# Patient Record
Sex: MALE | Race: WHITE | NOT HISPANIC OR LATINO | URBAN - METROPOLITAN AREA
[De-identification: names, ages, dates, MRNs, and addresses within clinical notes are randomized per-mention and may not be internally consistent; named-entity substitution may affect disease eponyms.]

---

## 2017-11-11 ENCOUNTER — OUTPATIENT (OUTPATIENT)
Dept: OUTPATIENT SERVICES | Facility: HOSPITAL | Age: 42
LOS: 1 days | Discharge: HOME | End: 2017-11-11

## 2017-11-11 DIAGNOSIS — M54.5 LOW BACK PAIN: ICD-10-CM

## 2017-11-18 ENCOUNTER — OUTPATIENT (OUTPATIENT)
Dept: OUTPATIENT SERVICES | Facility: HOSPITAL | Age: 42
LOS: 1 days | Discharge: HOME | End: 2017-11-18

## 2017-11-18 DIAGNOSIS — M54.5 LOW BACK PAIN: ICD-10-CM

## 2018-01-07 ENCOUNTER — TRANSCRIPTION ENCOUNTER (OUTPATIENT)
Age: 43
End: 2018-01-07

## 2018-01-19 ENCOUNTER — TRANSCRIPTION ENCOUNTER (OUTPATIENT)
Age: 43
End: 2018-01-19

## 2018-12-13 ENCOUNTER — OUTPATIENT (OUTPATIENT)
Dept: OUTPATIENT SERVICES | Facility: HOSPITAL | Age: 43
LOS: 1 days | Discharge: HOME | End: 2018-12-13

## 2018-12-13 DIAGNOSIS — F32.3 MAJOR DEPRESSIVE DISORDER, SINGLE EPISODE, SEVERE WITH PSYCHOTIC FEATURES: ICD-10-CM

## 2019-01-09 PROBLEM — Z00.00 ENCOUNTER FOR PREVENTIVE HEALTH EXAMINATION: Status: ACTIVE | Noted: 2019-01-09

## 2019-01-31 ENCOUNTER — APPOINTMENT (OUTPATIENT)
Dept: SURGERY | Facility: CLINIC | Age: 44
End: 2019-01-31
Payer: MEDICAID

## 2019-01-31 VITALS — WEIGHT: 315 LBS | BODY MASS INDEX: 46.65 KG/M2 | HEIGHT: 69 IN

## 2019-01-31 DIAGNOSIS — K42.9 UMBILICAL HERNIA W/OUT OBSTRUCTION OR GANGRENE: ICD-10-CM

## 2019-01-31 DIAGNOSIS — M62.08 SEPARATION OF MUSCLE (NONTRAUMATIC), OTHER SITE: ICD-10-CM

## 2019-01-31 PROCEDURE — 99203 OFFICE O/P NEW LOW 30 MIN: CPT

## 2019-01-31 NOTE — PHYSICAL EXAM
[Normal Breath Sounds] : Normal breath sounds [No Rash or Lesion] : No rash or lesion [Alert] : alert [Calm] : calm [JVD] : no jugular venous distention  [de-identified] : morbidly obese [de-identified] : normal [de-identified] : protuberant abdomen, diastasis recti\par  [de-identified] : reducible umbilical hernia

## 2019-01-31 NOTE — ASSESSMENT
[FreeTextEntry1] : Javan is a pleasant 43-year-old gentleman with a past medical history significant for anxiety and depression along with morbid obesity who presents with concerns about some mild asymmetry in the umbilical region suspicious for hernia. His extended family member recently had surgery with me and recommended he seek consultation with me in this regard.\par \par Physical examination demonstrates a small strawberry size bulge which is nontender and easily reducible consistent with an asymptomatic umbilical hernia. There is no evidence of incarceration or strangulation, and the patient denies any symptoms of obstruction.  He does have a large diastasis recti which is most likely related to his excess abdominal weight. Despite having lost nearly 80 pounds with a new FDA approved weight loss drug, his current BMI is still 61.\par \par Javan was counseled and reassured. He generally avoids any heavy lifting or strenuous activity and has no symptoms in the periumbilical region. We also discussed the importance of calorie restriction and healthy eating with regard to weight loss, hernia recurrence and one's overall health. I recommended he continue losing weight and once he reaches his goal weight, or sooner if he develops any symptoms in the area, we will repair his hernia.  He'll return to me in the future once he is ready for repair.

## 2019-01-31 NOTE — CONSULT LETTER
[Dear  ___] : Dear  [unfilled], [Courtesy Letter:] : I had the pleasure of seeing your patient, [unfilled], in my office today. [Please see my note below.] : Please see my note below. [Consult Closing:] : Thank you very much for allowing me to participate in the care of this patient.  If you have any questions, please do not hesitate to contact me. [FreeTextEntry3] : Respectfully,\par \par Madan Mccarthy M.D., FACS\par

## 2019-09-13 ENCOUNTER — RESULT REVIEW (OUTPATIENT)
Age: 44
End: 2019-09-13

## 2019-09-13 ENCOUNTER — OUTPATIENT (OUTPATIENT)
Dept: OUTPATIENT SERVICES | Facility: HOSPITAL | Age: 44
LOS: 1 days | Discharge: HOME | End: 2019-09-13
Payer: MEDICAID

## 2019-09-13 PROCEDURE — 88172 CYTP DX EVAL FNA 1ST EA SITE: CPT | Mod: 26

## 2019-09-13 PROCEDURE — 88173 CYTOPATH EVAL FNA REPORT: CPT | Mod: 26

## 2019-09-13 PROCEDURE — 88305 TISSUE EXAM BY PATHOLOGIST: CPT | Mod: 26

## 2019-09-13 PROCEDURE — 88177 CYTP FNA EVAL EA ADDL: CPT | Mod: 26

## 2019-09-13 PROCEDURE — 10005 FNA BX W/US GDN 1ST LES: CPT

## 2019-09-13 NOTE — PROGRESS NOTE ADULT - SUBJECTIVE AND OBJECTIVE BOX
INTERVENTIONAL RADIOLOGY BRIEF-OPERATIVE NOTE    Procedure: Left thyroid FNA    Pre-Op Diagnosis:  NUO    Post-Op Diagnosis:  NUO    Attending: Lourdes  Resident:  SADIQ    Anesthesia (type):  [ ] General Anesthesia  [ ] Sedation  [ ] Spinal Anesthesia  [x ] Local/Regional    Contrast: 0cc    Estimated Blood Loss: 0cc    Condition:   [ ] Critical  [ ] Serious  [ ] Fair   [x ] Good    Findings/Follow up Plan of Care:  3.6 cm left thyroid nodule    Specimens Removed:  FNA x 3    Implants:  none    Complications:  none immediate    Disposition:  DC home      Please call Interventional Radiology [phone] with any questions, concerns, or issues.

## 2019-09-20 DIAGNOSIS — C73 MALIGNANT NEOPLASM OF THYROID GLAND: ICD-10-CM

## 2019-09-20 DIAGNOSIS — E04.1 NONTOXIC SINGLE THYROID NODULE: ICD-10-CM

## 2019-10-02 ENCOUNTER — APPOINTMENT (OUTPATIENT)
Dept: OTOLARYNGOLOGY | Facility: CLINIC | Age: 44
End: 2019-10-02
Payer: MEDICAID

## 2019-10-02 DIAGNOSIS — Z78.9 OTHER SPECIFIED HEALTH STATUS: ICD-10-CM

## 2019-10-02 PROCEDURE — 99204 OFFICE O/P NEW MOD 45 MIN: CPT | Mod: 25

## 2019-10-02 PROCEDURE — 31575 DIAGNOSTIC LARYNGOSCOPY: CPT

## 2019-10-02 RX ORDER — SERTRALINE HYDROCHLORIDE 25 MG/1
TABLET, FILM COATED ORAL
Refills: 0 | Status: DISCONTINUED | COMMUNITY
End: 2019-10-02

## 2019-10-02 NOTE — HISTORY OF PRESENT ILLNESS
[de-identified] : 44 year old patient is present today for a thyroid nodule. FNA shows malignant cells [place] category VI . He has been aware that he has this thyroid nodule since august. No compressive symptoms. He denies dysphagia, hoarse voice or neck tightness.  Patient f/u with endocrinologist Dr. Garcia. No recent weight loss. Nodule was found to have nodule on exam by endo.

## 2019-10-02 NOTE — REVIEW OF SYSTEMS
[Patient Intake Form Reviewed] : Patient intake form was reviewed [As Noted in HPI] : as noted in HPI [Negative] : Endocrine [FreeTextEntry1] : all other ros negative

## 2019-10-02 NOTE — PROCEDURE
[Topical Lidocaine] : topical lidocaine [Oxymetazoline HCl] : oxymetazoline HCl [Flexible Endoscope] : examined with the flexible endoscope [Lesion] : lesion identified by mirror examination needing further evaluation [Normal] : posterior cricoid area had healthy pink mucosa in the interarytenoid area and the esophageal inlet

## 2019-11-17 ENCOUNTER — FORM ENCOUNTER (OUTPATIENT)
Age: 44
End: 2019-11-17

## 2019-11-18 ENCOUNTER — OUTPATIENT (OUTPATIENT)
Dept: OUTPATIENT SERVICES | Facility: HOSPITAL | Age: 44
LOS: 1 days | Discharge: HOME | End: 2019-11-18
Payer: COMMERCIAL

## 2019-11-18 VITALS
WEIGHT: 315 LBS | HEIGHT: 69 IN | RESPIRATION RATE: 20 BRPM | HEART RATE: 120 BPM | TEMPERATURE: 97 F | SYSTOLIC BLOOD PRESSURE: 140 MMHG | OXYGEN SATURATION: 97 % | DIASTOLIC BLOOD PRESSURE: 87 MMHG

## 2019-11-18 DIAGNOSIS — C73 MALIGNANT NEOPLASM OF THYROID GLAND: ICD-10-CM

## 2019-11-18 DIAGNOSIS — Z01.818 ENCOUNTER FOR OTHER PREPROCEDURAL EXAMINATION: ICD-10-CM

## 2019-11-18 LAB
ALBUMIN SERPL ELPH-MCNC: 5.1 G/DL — SIGNIFICANT CHANGE UP (ref 3.5–5.2)
ALP SERPL-CCNC: 71 U/L — SIGNIFICANT CHANGE UP (ref 30–115)
ALT FLD-CCNC: 41 U/L — SIGNIFICANT CHANGE UP (ref 0–41)
ANION GAP SERPL CALC-SCNC: 18 MMOL/L — HIGH (ref 7–14)
APTT BLD: 42.6 SEC — HIGH (ref 27–39.2)
AST SERPL-CCNC: 30 U/L — SIGNIFICANT CHANGE UP (ref 0–41)
BASOPHILS # BLD AUTO: 0.09 K/UL — SIGNIFICANT CHANGE UP (ref 0–0.2)
BASOPHILS NFR BLD AUTO: 0.9 % — SIGNIFICANT CHANGE UP (ref 0–1)
BILIRUB SERPL-MCNC: 0.4 MG/DL — SIGNIFICANT CHANGE UP (ref 0.2–1.2)
BLD GP AB SCN SERPL QL: SIGNIFICANT CHANGE UP
BUN SERPL-MCNC: 17 MG/DL — SIGNIFICANT CHANGE UP (ref 10–20)
CALCIUM SERPL-MCNC: 10.3 MG/DL — HIGH (ref 8.5–10.1)
CHLORIDE SERPL-SCNC: 99 MMOL/L — SIGNIFICANT CHANGE UP (ref 98–110)
CO2 SERPL-SCNC: 23 MMOL/L — SIGNIFICANT CHANGE UP (ref 17–32)
CREAT SERPL-MCNC: 0.9 MG/DL — SIGNIFICANT CHANGE UP (ref 0.7–1.5)
EOSINOPHIL # BLD AUTO: 0.11 K/UL — SIGNIFICANT CHANGE UP (ref 0–0.7)
EOSINOPHIL NFR BLD AUTO: 1.1 % — SIGNIFICANT CHANGE UP (ref 0–8)
GLUCOSE SERPL-MCNC: 108 MG/DL — HIGH (ref 70–99)
HCT VFR BLD CALC: 43.4 % — SIGNIFICANT CHANGE UP (ref 42–52)
HGB BLD-MCNC: 14.4 G/DL — SIGNIFICANT CHANGE UP (ref 14–18)
IMM GRANULOCYTES NFR BLD AUTO: 3.6 % — HIGH (ref 0.1–0.3)
INR BLD: 1.05 RATIO — SIGNIFICANT CHANGE UP (ref 0.65–1.3)
LYMPHOCYTES # BLD AUTO: 1.96 K/UL — SIGNIFICANT CHANGE UP (ref 1.2–3.4)
LYMPHOCYTES # BLD AUTO: 19.6 % — LOW (ref 20.5–51.1)
MCHC RBC-ENTMCNC: 29.8 PG — SIGNIFICANT CHANGE UP (ref 27–31)
MCHC RBC-ENTMCNC: 33.2 G/DL — SIGNIFICANT CHANGE UP (ref 32–37)
MCV RBC AUTO: 89.9 FL — SIGNIFICANT CHANGE UP (ref 80–94)
MONOCYTES # BLD AUTO: 0.57 K/UL — SIGNIFICANT CHANGE UP (ref 0.1–0.6)
MONOCYTES NFR BLD AUTO: 5.7 % — SIGNIFICANT CHANGE UP (ref 1.7–9.3)
NEUTROPHILS # BLD AUTO: 6.89 K/UL — HIGH (ref 1.4–6.5)
NEUTROPHILS NFR BLD AUTO: 69.1 % — SIGNIFICANT CHANGE UP (ref 42.2–75.2)
NRBC # BLD: 0 /100 WBCS — SIGNIFICANT CHANGE UP (ref 0–0)
PLATELET # BLD AUTO: 219 K/UL — SIGNIFICANT CHANGE UP (ref 130–400)
POTASSIUM SERPL-MCNC: 4.7 MMOL/L — SIGNIFICANT CHANGE UP (ref 3.5–5)
POTASSIUM SERPL-SCNC: 4.7 MMOL/L — SIGNIFICANT CHANGE UP (ref 3.5–5)
PROT SERPL-MCNC: 8.5 G/DL — HIGH (ref 6–8)
PROTHROM AB SERPL-ACNC: 12.1 SEC — SIGNIFICANT CHANGE UP (ref 9.95–12.87)
RBC # BLD: 4.83 M/UL — SIGNIFICANT CHANGE UP (ref 4.7–6.1)
RBC # FLD: 14.6 % — HIGH (ref 11.5–14.5)
SODIUM SERPL-SCNC: 140 MMOL/L — SIGNIFICANT CHANGE UP (ref 135–146)
WBC # BLD: 9.98 K/UL — SIGNIFICANT CHANGE UP (ref 4.8–10.8)
WBC # FLD AUTO: 9.98 K/UL — SIGNIFICANT CHANGE UP (ref 4.8–10.8)

## 2019-11-18 PROCEDURE — 93010 ELECTROCARDIOGRAM REPORT: CPT

## 2019-11-18 PROCEDURE — 71046 X-RAY EXAM CHEST 2 VIEWS: CPT | Mod: 26

## 2019-11-18 RX ORDER — DULAGLUTIDE 4.5 MG/.5ML
0.75 INJECTION, SOLUTION SUBCUTANEOUS
Qty: 0 | Refills: 0 | DISCHARGE

## 2019-11-18 NOTE — H&P PST ADULT - REASON FOR ADMISSION
45 yo morbidly obese patient present to PAST for Total thyroidectomy under GA by DR. Dobbs at [facility] on 12/2/2019 at [facility].

## 2019-11-18 NOTE — H&P PST ADULT - HISTORY OF PRESENT ILLNESS
44 year old patient is present today for a thyroid nodule. FNA shows malignant cells [place] category VI . He has been aware that he has this thyroid nodule since august. No compressive symptoms. He denies dysphagia, hoarse voice or neck tightness. Patient f/u with endocrinologist Dr. Garcia. No recent weight loss. Nodule was found to have nodule on exam by endo.   Patient denies any c/o Cp, sob, fever, cough or dysuria. Patient c/o palpitations and anxiety - 130. notified Dr. Bryant's office. Patient able to walk 1 fos with out SOB. Positive for TEJAS. CPAP machine is use. Instruction given to bring on the DOS. 44 year old patient is present today for a thyroid nodule. No compressive symptoms. He denies dysphagia, hoarse voice or neck tightness or weight loss. Patient denies any c/o Cp, sob, fever, cough or dysuria. Patient c/o palpitations and anxiety - 130. Notified 's office. Patient refused ER visit. Patient taken his PRN dose of klonopin 1 mg and improvement noted after 30 min. HR down to 116-117. Patient able to walk 1 fos with out SOB. Positive for TEJAS. CPAP machine is use. Instruction given to bring on the DOS.

## 2019-11-18 NOTE — H&P PST ADULT - NSICDXFAMILYHX_GEN_ALL_CORE_FT
FAMILY HISTORY:  FH: bladder cancer, Mother  FH: CAD (coronary artery disease), Father  FH: HTN (hypertension)  FH: obesity
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
## 2019-11-19 PROBLEM — E04.1 NONTOXIC SINGLE THYROID NODULE: Chronic | Status: ACTIVE | Noted: 2019-11-18

## 2019-11-19 PROBLEM — E11.9 TYPE 2 DIABETES MELLITUS WITHOUT COMPLICATIONS: Chronic | Status: ACTIVE | Noted: 2019-11-18

## 2019-11-19 PROBLEM — F32.9 MAJOR DEPRESSIVE DISORDER, SINGLE EPISODE, UNSPECIFIED: Chronic | Status: ACTIVE | Noted: 2019-11-18

## 2019-11-19 PROBLEM — G47.33 OBSTRUCTIVE SLEEP APNEA (ADULT) (PEDIATRIC): Chronic | Status: ACTIVE | Noted: 2019-11-18

## 2019-11-19 PROBLEM — Z87.898 PERSONAL HISTORY OF OTHER SPECIFIED CONDITIONS: Chronic | Status: ACTIVE | Noted: 2019-11-18

## 2019-11-19 PROBLEM — M54.9 DORSALGIA, UNSPECIFIED: Chronic | Status: ACTIVE | Noted: 2019-11-18

## 2019-11-19 PROBLEM — F41.9 ANXIETY DISORDER, UNSPECIFIED: Chronic | Status: ACTIVE | Noted: 2019-11-18

## 2019-11-19 LAB
ESTIMATED AVERAGE GLUCOSE: 117 MG/DL — HIGH (ref 68–114)
HBA1C BLD-MCNC: 5.7 % — HIGH (ref 4–5.6)
T3 SERPL-MCNC: 110 NG/DL — SIGNIFICANT CHANGE UP (ref 80–200)
T4 AB SER-ACNC: 6.1 UG/DL — SIGNIFICANT CHANGE UP (ref 4.6–12)

## 2019-12-02 ENCOUNTER — OUTPATIENT (OUTPATIENT)
Dept: OUTPATIENT SERVICES | Facility: HOSPITAL | Age: 44
LOS: 1 days | Discharge: HOME | End: 2019-12-02
Payer: MEDICAID

## 2019-12-02 ENCOUNTER — APPOINTMENT (OUTPATIENT)
Dept: OTOLARYNGOLOGY | Facility: HOSPITAL | Age: 44
End: 2019-12-02
Payer: MEDICAID

## 2019-12-02 ENCOUNTER — RESULT REVIEW (OUTPATIENT)
Age: 44
End: 2019-12-02

## 2019-12-02 VITALS — SYSTOLIC BLOOD PRESSURE: 162 MMHG | DIASTOLIC BLOOD PRESSURE: 99 MMHG | HEART RATE: 111 BPM | RESPIRATION RATE: 18 BRPM

## 2019-12-02 VITALS
HEIGHT: 69 IN | TEMPERATURE: 98 F | DIASTOLIC BLOOD PRESSURE: 93 MMHG | RESPIRATION RATE: 20 BRPM | WEIGHT: 315 LBS | SYSTOLIC BLOOD PRESSURE: 198 MMHG | HEART RATE: 117 BPM

## 2019-12-02 LAB
CALCIUM SERPL-MCNC: 10.1 MG/DL — SIGNIFICANT CHANGE UP (ref 8.5–10.1)
GLUCOSE BLDC GLUCOMTR-MCNC: 130 MG/DL — HIGH (ref 70–99)
GLUCOSE BLDC GLUCOMTR-MCNC: 156 MG/DL — HIGH (ref 70–99)
PTH-INTACT IO % DIF SERPL: 39.1 PG/ML — SIGNIFICANT CHANGE UP (ref 19–73)

## 2019-12-02 PROCEDURE — 60252 REMOVAL OF THYROID: CPT

## 2019-12-02 PROCEDURE — 60240 REMOVAL OF THYROID: CPT | Mod: 59

## 2019-12-02 PROCEDURE — 88307 TISSUE EXAM BY PATHOLOGIST: CPT | Mod: 26

## 2019-12-02 RX ORDER — ONDANSETRON 8 MG/1
4 TABLET, FILM COATED ORAL ONCE
Refills: 0 | Status: DISCONTINUED | OUTPATIENT
Start: 2019-12-02 | End: 2019-12-18

## 2019-12-02 RX ORDER — LEVOTHYROXINE SODIUM 125 MCG
1 TABLET ORAL
Qty: 30 | Refills: 0
Start: 2019-12-02

## 2019-12-02 RX ORDER — SERTRALINE 25 MG/1
200 TABLET, FILM COATED ORAL
Qty: 0 | Refills: 0 | DISCHARGE

## 2019-12-02 RX ORDER — HYDROMORPHONE HYDROCHLORIDE 2 MG/ML
1 INJECTION INTRAMUSCULAR; INTRAVENOUS; SUBCUTANEOUS
Refills: 0 | Status: DISCONTINUED | OUTPATIENT
Start: 2019-12-02 | End: 2019-12-02

## 2019-12-02 RX ORDER — CALCITRIOL 0.5 UG/1
1 CAPSULE ORAL
Qty: 30 | Refills: 0
Start: 2019-12-02

## 2019-12-02 RX ORDER — SODIUM CHLORIDE 9 MG/ML
1000 INJECTION, SOLUTION INTRAVENOUS
Refills: 0 | Status: DISCONTINUED | OUTPATIENT
Start: 2019-12-02 | End: 2019-12-18

## 2019-12-02 RX ORDER — OXYCODONE AND ACETAMINOPHEN 5; 325 MG/1; MG/1
1 TABLET ORAL
Qty: 15 | Refills: 0
Start: 2019-12-02

## 2019-12-02 RX ORDER — HYDROMORPHONE HYDROCHLORIDE 2 MG/ML
0.5 INJECTION INTRAMUSCULAR; INTRAVENOUS; SUBCUTANEOUS
Refills: 0 | Status: DISCONTINUED | OUTPATIENT
Start: 2019-12-02 | End: 2019-12-02

## 2019-12-02 RX ORDER — CALCIUM CARBONATE 500(1250)
1 TABLET ORAL
Qty: 30 | Refills: 0
Start: 2019-12-02

## 2019-12-02 RX ADMIN — HYDROMORPHONE HYDROCHLORIDE 0.5 MILLIGRAM(S): 2 INJECTION INTRAMUSCULAR; INTRAVENOUS; SUBCUTANEOUS at 12:54

## 2019-12-02 RX ADMIN — HYDROMORPHONE HYDROCHLORIDE 1 MILLIGRAM(S): 2 INJECTION INTRAMUSCULAR; INTRAVENOUS; SUBCUTANEOUS at 13:00

## 2019-12-02 RX ADMIN — HYDROMORPHONE HYDROCHLORIDE 0.5 MILLIGRAM(S): 2 INJECTION INTRAMUSCULAR; INTRAVENOUS; SUBCUTANEOUS at 13:26

## 2019-12-02 RX ADMIN — HYDROMORPHONE HYDROCHLORIDE 1 MILLIGRAM(S): 2 INJECTION INTRAMUSCULAR; INTRAVENOUS; SUBCUTANEOUS at 12:26

## 2019-12-02 NOTE — ASU DISCHARGE PLAN (ADULT/PEDIATRIC) - ASU DC SPECIAL INSTRUCTIONSFT
Synthroid 100 once daily in AM  calcium and vit d supplements    pain control:  tylenol and motrin  percocet for breakthrough pain
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
## 2019-12-02 NOTE — ASU DISCHARGE PLAN (ADULT/PEDIATRIC) - CALL YOUR DOCTOR IF YOU HAVE ANY OF THE FOLLOWING:
Bleeding that does not stop/Wound/Surgical Site with redness, or foul smelling discharge or pus/Numbness, tingling, color or temperature change to extremity

## 2019-12-02 NOTE — CHART NOTE - NSCHARTNOTEFT_GEN_A_CORE
PACU ANESTHESIA ADMISSION NOTE      Procedure: Total thyroidectomy with limited neck dissection    Post op diagnosis:  Thyroid cancer      ____  Intubated  TV:______       Rate: ______      FiO2: ______    _x___  Patent Airway    _x___  Full return of protective reflexes    __x__  Full recovery from anesthesia / back to baseline     Vitals:   T: 36.6          R:    15              BP:  151/69                Sat: 96                  P: 72      Mental Status:  _x___ Awake   ___x__ Alert   _____ Drowsy   _____ Sedated    Nausea/Vomiting:  __x__ NO  ______Yes,   See Post - Op Orders          Pain Scale (0-10):  __x___    Treatment: ____ None    _x___ See Post - Op/PCA Orders    Post - Operative Fluids:   ____ Oral   __x__ See Post - Op Orders    Plan: Discharge:   __x__Home       _____Floor     _____Critical Care    _____  Other:_________________    Comments: tolerated procedure well

## 2019-12-02 NOTE — PRE-ANESTHESIA EVALUATION ADULT - NSANTHPMHFT_GEN_ALL_CORE
Chart reviewed, Med, Cardiology, Pulmonary evaluation seen. Labs, EKG seen ( Sinus tachycardia ), stress test negative. FS 130mg/dl.

## 2019-12-04 LAB — SURGICAL PATHOLOGY STUDY: SIGNIFICANT CHANGE UP

## 2019-12-05 DIAGNOSIS — C73 MALIGNANT NEOPLASM OF THYROID GLAND: ICD-10-CM
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
## 2019-12-09 PROBLEM — E66.01 MORBID (SEVERE) OBESITY DUE TO EXCESS CALORIES: Chronic | Status: ACTIVE | Noted: 2019-11-18

## 2019-12-09 PROBLEM — H18.609 KERATOCONUS, UNSPECIFIED, UNSPECIFIED EYE: Chronic | Status: ACTIVE | Noted: 2019-11-18

## 2019-12-18 ENCOUNTER — APPOINTMENT (OUTPATIENT)
Dept: OTOLARYNGOLOGY | Facility: CLINIC | Age: 44
End: 2019-12-18
Payer: SELF-PAY

## 2019-12-18 DIAGNOSIS — C73 MALIGNANT NEOPLASM OF THYROID GLAND: ICD-10-CM

## 2019-12-18 PROCEDURE — 99024 POSTOP FOLLOW-UP VISIT: CPT

## 2019-12-18 NOTE — REASON FOR VISIT
[Subsequent Evaluation] : a subsequent evaluation for [FreeTextEntry2] : total thyroidectomy sx;12/2/19

## 2020-11-20 ENCOUNTER — OUTPATIENT (OUTPATIENT)
Dept: OUTPATIENT SERVICES | Facility: HOSPITAL | Age: 45
LOS: 1 days | Discharge: HOME | End: 2020-11-20
Payer: MEDICARE

## 2020-11-20 DIAGNOSIS — C73 MALIGNANT NEOPLASM OF THYROID GLAND: ICD-10-CM

## 2020-11-20 PROCEDURE — 76536 US EXAM OF HEAD AND NECK: CPT | Mod: 26

## 2021-01-11 ENCOUNTER — TRANSCRIPTION ENCOUNTER (OUTPATIENT)
Age: 46
End: 2021-01-11

## 2021-02-21 ENCOUNTER — TRANSCRIPTION ENCOUNTER (OUTPATIENT)
Age: 46
End: 2021-02-21

## 2021-03-04 NOTE — PRE-ANESTHESIA EVALUATION ADULT - NSATTENDATTESTRD_GEN_ALL_CORE
Detail Level: Zone
Detail Level: Simple
The patient has been re-examined and I agree with the above assessment or I updated with my findings.

## 2021-12-22 ENCOUNTER — OUTPATIENT (OUTPATIENT)
Dept: OUTPATIENT SERVICES | Facility: HOSPITAL | Age: 46
LOS: 1 days | Discharge: HOME | End: 2021-12-22
Payer: MEDICARE

## 2021-12-22 DIAGNOSIS — C73 MALIGNANT NEOPLASM OF THYROID GLAND: ICD-10-CM

## 2021-12-22 PROCEDURE — 76536 US EXAM OF HEAD AND NECK: CPT | Mod: 26

## 2022-06-02 ENCOUNTER — NON-APPOINTMENT (OUTPATIENT)
Age: 47
End: 2022-06-02

## 2022-06-23 ENCOUNTER — RX RENEWAL (OUTPATIENT)
Age: 47
End: 2022-06-23

## 2022-06-27 DIAGNOSIS — M54.12 RADICULOPATHY, CERVICAL REGION: ICD-10-CM

## 2022-06-27 DIAGNOSIS — M51.16 INTERVERTEBRAL DISC DISORDERS WITH RADICULOPATHY, LUMBAR REGION: ICD-10-CM

## 2022-09-06 ENCOUNTER — APPOINTMENT (OUTPATIENT)
Dept: PAIN MANAGEMENT | Facility: CLINIC | Age: 47
End: 2022-09-06

## 2022-09-24 ENCOUNTER — APPOINTMENT (OUTPATIENT)
Age: 47
End: 2022-09-24

## 2022-09-24 VITALS
RESPIRATION RATE: 14 BRPM | DIASTOLIC BLOOD PRESSURE: 82 MMHG | HEART RATE: 80 BPM | WEIGHT: 315 LBS | OXYGEN SATURATION: 98 % | HEIGHT: 69 IN | SYSTOLIC BLOOD PRESSURE: 124 MMHG | BODY MASS INDEX: 46.65 KG/M2

## 2022-09-24 PROCEDURE — 99204 OFFICE O/P NEW MOD 45 MIN: CPT

## 2022-09-24 RX ORDER — NYSTATIN 100000 1/G
100000 POWDER TOPICAL
Qty: 15 | Refills: 0 | Status: COMPLETED | COMMUNITY
Start: 2019-04-12 | End: 2022-09-24

## 2022-09-24 RX ORDER — AMOXICILLIN AND CLAVULANATE POTASSIUM 875; 125 MG/1; MG/1
875-125 TABLET, COATED ORAL
Qty: 20 | Refills: 0 | Status: COMPLETED | COMMUNITY
Start: 2019-07-23 | End: 2022-09-24

## 2022-09-24 RX ORDER — DULOXETINE HYDROCHLORIDE 30 MG/1
30 CAPSULE, DELAYED RELEASE PELLETS ORAL
Qty: 30 | Refills: 0 | Status: COMPLETED | COMMUNITY
Start: 2019-09-28 | End: 2022-09-24

## 2022-09-24 RX ORDER — IPRATROPIUM BROMIDE 42 UG/1
0.06 SPRAY NASAL
Qty: 15 | Refills: 0 | Status: COMPLETED | COMMUNITY
Start: 2019-07-23 | End: 2022-09-24

## 2022-09-24 RX ORDER — DULAGLUTIDE 0.75 MG/.5ML
0.75 INJECTION, SOLUTION SUBCUTANEOUS
Qty: 2 | Refills: 0 | Status: COMPLETED | COMMUNITY
Start: 2019-09-17 | End: 2022-09-24

## 2022-09-24 RX ORDER — GABAPENTIN 300 MG/1
300 CAPSULE ORAL
Qty: 120 | Refills: 0 | Status: COMPLETED | COMMUNITY
Start: 2019-04-02 | End: 2022-09-24

## 2022-09-24 RX ORDER — BLOOD SUGAR DIAGNOSTIC
STRIP MISCELLANEOUS
Qty: 100 | Refills: 0 | Status: COMPLETED | COMMUNITY
Start: 2019-05-01 | End: 2022-09-24

## 2022-09-24 RX ORDER — METFORMIN HYDROCHLORIDE 1000 MG/1
1000 TABLET, COATED ORAL
Qty: 180 | Refills: 0 | Status: COMPLETED | COMMUNITY
Start: 2019-04-20 | End: 2022-09-24

## 2022-09-24 RX ORDER — IBUPROFEN 800 MG/1
800 TABLET, FILM COATED ORAL
Qty: 90 | Refills: 0 | Status: COMPLETED | COMMUNITY
Start: 2019-05-10 | End: 2022-09-24

## 2022-09-24 RX ORDER — DIVALPROEX SODIUM 250 MG/1
250 TABLET, DELAYED RELEASE ORAL
Qty: 60 | Refills: 0 | Status: COMPLETED | COMMUNITY
Start: 2019-08-07 | End: 2022-09-24

## 2022-09-24 RX ORDER — BLOOD-GLUCOSE METER
W/DEVICE KIT MISCELLANEOUS
Qty: 1 | Refills: 0 | Status: COMPLETED | COMMUNITY
Start: 2019-04-18 | End: 2022-09-24

## 2022-09-24 RX ORDER — NALTREXONE HYDROCHLORIDE AND BUPROPION HYDROCHLORIDE 8; 90 MG/1; MG/1
8-90 TABLET, EXTENDED RELEASE ORAL
Qty: 120 | Refills: 0 | Status: COMPLETED | COMMUNITY
Start: 2018-08-07 | End: 2022-09-24

## 2022-09-24 RX ORDER — PROPRANOLOL HYDROCHLORIDE 40 MG/1
40 TABLET ORAL
Qty: 60 | Refills: 0 | Status: COMPLETED | COMMUNITY
Start: 2019-05-28 | End: 2022-09-24

## 2022-09-24 RX ORDER — OXYCODONE AND ACETAMINOPHEN 5; 325 MG/1; MG/1
5-325 TABLET ORAL
Qty: 15 | Refills: 0 | Status: COMPLETED | COMMUNITY
Start: 2019-12-02 | End: 2022-09-24

## 2022-09-24 RX ORDER — ZOLPIDEM TARTRATE 10 MG/1
10 TABLET ORAL
Qty: 30 | Refills: 0 | Status: COMPLETED | COMMUNITY
Start: 2019-05-28 | End: 2022-09-24

## 2022-09-24 RX ORDER — CLONAZEPAM 1 MG/1
1 TABLET ORAL
Qty: 60 | Refills: 0 | Status: COMPLETED | COMMUNITY
Start: 2019-04-02 | End: 2022-09-24

## 2022-09-24 RX ORDER — DIAZEPAM 5 MG/1
5 TABLET ORAL
Qty: 10 | Refills: 0 | Status: COMPLETED | COMMUNITY
Start: 2019-11-21 | End: 2022-09-24

## 2022-09-24 RX ORDER — LANCETS 33 GAUGE
EACH MISCELLANEOUS
Qty: 100 | Refills: 0 | Status: COMPLETED | COMMUNITY
Start: 2019-04-22 | End: 2022-09-24

## 2022-09-24 RX ORDER — GEMFIBROZIL 600 MG/1
600 TABLET, FILM COATED ORAL
Qty: 180 | Refills: 0 | Status: COMPLETED | COMMUNITY
Start: 2019-04-20 | End: 2022-09-24

## 2022-09-24 NOTE — REASON FOR VISIT
[Initial] : an initial visit [Sleep Apnea] : sleep apnea [Obesity] : obesity [TextBox_44] : The patient was seen very many years ago's office for the original diagnosis of sleep apnea.  He was found to have severe disease and placed on CPAP.  He was then lost to follow-up utilizing another physician services.  That relationship has ended and he is looking to reestablish contact.\par \par The patient has been using his CPAP faithfully since then.  This is his original machine.  He believes it is at the least 5 or more years old.  There is now starting to give him trouble.  Overall he thinks he sleeps reasonably well though he is starting to develop signs of excessive sleepiness during the day especially in the mid afternoon siesta.  He drives back and forth to work but rarely feels he is going to have a sleeping issue.\par \par The patient also states that his girlfriend tells him that he is making jerking movements during sleep.  He cannot describe it more than this.

## 2022-09-24 NOTE — ASSESSMENT
[FreeTextEntry1] : Assessment:\par TEJAS\par Obesity\par Abnormal movements during sleep\par \par PLAN:\par The patient is benefitting from the PAP device .   He needs to get a new unit.  The patient may need a repeat CPAP titration.\par The movements may be related to the periodic limb movement disorder or may be due to an arousal from the breakthrough apnea event.\par New supplies ordered \par Weight loss discussed\par I stressed the need maintain compliance  with the PAP device.\par The patient is not to use an Ozone or UV sterilizer. \par F/U in 3 months\par \par .

## 2022-09-28 ENCOUNTER — RX RENEWAL (OUTPATIENT)
Age: 47
End: 2022-09-28

## 2022-11-09 ENCOUNTER — OUTPATIENT (OUTPATIENT)
Dept: OUTPATIENT SERVICES | Facility: HOSPITAL | Age: 47
LOS: 1 days | Discharge: HOME | End: 2022-11-09

## 2022-11-09 ENCOUNTER — APPOINTMENT (OUTPATIENT)
Dept: SLEEP CENTER | Facility: HOSPITAL | Age: 47
End: 2022-11-09

## 2022-11-09 PROCEDURE — 95806 SLEEP STUDY UNATT&RESP EFFT: CPT | Mod: 26

## 2022-11-10 DIAGNOSIS — G47.33 OBSTRUCTIVE SLEEP APNEA (ADULT) (PEDIATRIC): ICD-10-CM

## 2022-11-30 ENCOUNTER — RX RENEWAL (OUTPATIENT)
Age: 47
End: 2022-11-30

## 2022-12-07 ENCOUNTER — OUTPATIENT (OUTPATIENT)
Dept: OUTPATIENT SERVICES | Facility: HOSPITAL | Age: 47
LOS: 1 days | Discharge: HOME | End: 2022-12-07
Payer: MEDICARE

## 2022-12-07 ENCOUNTER — APPOINTMENT (OUTPATIENT)
Dept: SLEEP CENTER | Facility: HOSPITAL | Age: 47
End: 2022-12-07

## 2022-12-07 PROCEDURE — 95800 SLP STDY UNATTENDED: CPT | Mod: 26

## 2022-12-08 DIAGNOSIS — G47.33 OBSTRUCTIVE SLEEP APNEA (ADULT) (PEDIATRIC): ICD-10-CM

## 2023-01-21 ENCOUNTER — OUTPATIENT (OUTPATIENT)
Dept: OUTPATIENT SERVICES | Facility: HOSPITAL | Age: 48
LOS: 1 days | Discharge: HOME | End: 2023-01-21
Payer: MEDICARE

## 2023-01-21 DIAGNOSIS — E04.1 NONTOXIC SINGLE THYROID NODULE: ICD-10-CM

## 2023-01-21 PROCEDURE — 76536 US EXAM OF HEAD AND NECK: CPT | Mod: 26

## 2023-02-10 ENCOUNTER — RX RENEWAL (OUTPATIENT)
Age: 48
End: 2023-02-10

## 2023-02-23 ENCOUNTER — APPOINTMENT (OUTPATIENT)
Dept: PULMONOLOGY | Facility: CLINIC | Age: 48
End: 2023-02-23
Payer: MEDICARE

## 2023-02-23 VITALS
WEIGHT: 315 LBS | OXYGEN SATURATION: 99 % | HEIGHT: 69 IN | BODY MASS INDEX: 46.65 KG/M2 | HEART RATE: 152 BPM | RESPIRATION RATE: 14 BRPM | SYSTOLIC BLOOD PRESSURE: 120 MMHG | DIASTOLIC BLOOD PRESSURE: 80 MMHG

## 2023-02-23 DIAGNOSIS — E66.01 MORBID (SEVERE) OBESITY DUE TO EXCESS CALORIES: ICD-10-CM

## 2023-02-23 DIAGNOSIS — G47.33 OBSTRUCTIVE SLEEP APNEA (ADULT) (PEDIATRIC): ICD-10-CM

## 2023-02-23 PROCEDURE — 99213 OFFICE O/P EST LOW 20 MIN: CPT

## 2023-02-23 NOTE — REASON FOR VISIT
[Follow-Up] : a follow-up visit [Sleep Apnea] : sleep apnea [Obesity] : obesity [TextBox_44] : Overall doing well not having any issues.  He received his new CPAP unit but feels the pressure is a little low.

## 2023-02-23 NOTE — ASSESSMENT
[FreeTextEntry1] : Assessment:\par TEJAS\par Obesity\par Abnormal movements during sleep\par \par PLAN:\par The patient is benefitting from the PAP device .   He has  a new unit.  \par Will empirically turn up the pressure of 2 points.\par If there are continued issues he will need a CPAP titration\par New supplies ordered \par Weight loss discussed\par I stressed the need maintain compliance  with the PAP device.\par The patient is not to use an Ozone or UV sterilizer. \par F/U in 6 months\par \par .

## 2023-07-17 ENCOUNTER — OUTPATIENT (OUTPATIENT)
Dept: OUTPATIENT SERVICES | Facility: HOSPITAL | Age: 48
LOS: 1 days | End: 2023-07-17
Payer: MEDICARE

## 2023-07-17 DIAGNOSIS — R10.9 UNSPECIFIED ABDOMINAL PAIN: ICD-10-CM

## 2023-07-17 DIAGNOSIS — Z00.8 ENCOUNTER FOR OTHER GENERAL EXAMINATION: ICD-10-CM

## 2023-07-17 PROCEDURE — 73070 X-RAY EXAM OF ELBOW: CPT | Mod: LT

## 2023-07-17 PROCEDURE — 76700 US EXAM ABDOM COMPLETE: CPT

## 2023-07-17 PROCEDURE — 73090 X-RAY EXAM OF FOREARM: CPT | Mod: LT

## 2023-07-17 PROCEDURE — 73090 X-RAY EXAM OF FOREARM: CPT | Mod: 26,LT

## 2023-07-17 PROCEDURE — 76700 US EXAM ABDOM COMPLETE: CPT | Mod: 26

## 2023-07-17 PROCEDURE — 73070 X-RAY EXAM OF ELBOW: CPT | Mod: 26,LT

## 2023-07-18 DIAGNOSIS — R10.9 UNSPECIFIED ABDOMINAL PAIN: ICD-10-CM

## 2023-11-25 ENCOUNTER — NON-APPOINTMENT (OUTPATIENT)
Age: 48
End: 2023-11-25

## 2023-11-26 ENCOUNTER — INPATIENT (INPATIENT)
Facility: HOSPITAL | Age: 48
LOS: 0 days | Discharge: HOME | DRG: 310 | End: 2023-11-27
Attending: INTERNAL MEDICINE | Admitting: STUDENT IN AN ORGANIZED HEALTH CARE EDUCATION/TRAINING PROGRAM
Payer: MEDICARE

## 2023-11-26 VITALS
DIASTOLIC BLOOD PRESSURE: 104 MMHG | RESPIRATION RATE: 20 BRPM | HEART RATE: 151 BPM | OXYGEN SATURATION: 96 % | WEIGHT: 315 LBS | TEMPERATURE: 98 F | SYSTOLIC BLOOD PRESSURE: 178 MMHG

## 2023-11-26 DIAGNOSIS — I48.91 UNSPECIFIED ATRIAL FIBRILLATION: ICD-10-CM

## 2023-11-26 LAB
ALBUMIN SERPL ELPH-MCNC: 3.9 G/DL — SIGNIFICANT CHANGE UP (ref 3.5–5.2)
ALBUMIN SERPL ELPH-MCNC: 3.9 G/DL — SIGNIFICANT CHANGE UP (ref 3.5–5.2)
ALP SERPL-CCNC: 71 U/L — SIGNIFICANT CHANGE UP (ref 30–115)
ALP SERPL-CCNC: 71 U/L — SIGNIFICANT CHANGE UP (ref 30–115)
ALT FLD-CCNC: 26 U/L — SIGNIFICANT CHANGE UP (ref 0–41)
ALT FLD-CCNC: 26 U/L — SIGNIFICANT CHANGE UP (ref 0–41)
ANION GAP SERPL CALC-SCNC: 10 MMOL/L — SIGNIFICANT CHANGE UP (ref 7–14)
ANION GAP SERPL CALC-SCNC: 10 MMOL/L — SIGNIFICANT CHANGE UP (ref 7–14)
AST SERPL-CCNC: 21 U/L — SIGNIFICANT CHANGE UP (ref 0–41)
AST SERPL-CCNC: 21 U/L — SIGNIFICANT CHANGE UP (ref 0–41)
BASOPHILS # BLD AUTO: 0.06 K/UL — SIGNIFICANT CHANGE UP (ref 0–0.2)
BASOPHILS # BLD AUTO: 0.06 K/UL — SIGNIFICANT CHANGE UP (ref 0–0.2)
BASOPHILS NFR BLD AUTO: 0.6 % — SIGNIFICANT CHANGE UP (ref 0–1)
BASOPHILS NFR BLD AUTO: 0.6 % — SIGNIFICANT CHANGE UP (ref 0–1)
BILIRUB SERPL-MCNC: 0.7 MG/DL — SIGNIFICANT CHANGE UP (ref 0.2–1.2)
BILIRUB SERPL-MCNC: 0.7 MG/DL — SIGNIFICANT CHANGE UP (ref 0.2–1.2)
BUN SERPL-MCNC: 17 MG/DL — SIGNIFICANT CHANGE UP (ref 10–20)
BUN SERPL-MCNC: 17 MG/DL — SIGNIFICANT CHANGE UP (ref 10–20)
CALCIUM SERPL-MCNC: 8.8 MG/DL — SIGNIFICANT CHANGE UP (ref 8.4–10.5)
CALCIUM SERPL-MCNC: 8.8 MG/DL — SIGNIFICANT CHANGE UP (ref 8.4–10.5)
CHLORIDE SERPL-SCNC: 105 MMOL/L — SIGNIFICANT CHANGE UP (ref 98–110)
CHLORIDE SERPL-SCNC: 105 MMOL/L — SIGNIFICANT CHANGE UP (ref 98–110)
CO2 SERPL-SCNC: 25 MMOL/L — SIGNIFICANT CHANGE UP (ref 17–32)
CO2 SERPL-SCNC: 25 MMOL/L — SIGNIFICANT CHANGE UP (ref 17–32)
CREAT SERPL-MCNC: 0.9 MG/DL — SIGNIFICANT CHANGE UP (ref 0.7–1.5)
CREAT SERPL-MCNC: 0.9 MG/DL — SIGNIFICANT CHANGE UP (ref 0.7–1.5)
EGFR: 105 ML/MIN/1.73M2 — SIGNIFICANT CHANGE UP
EGFR: 105 ML/MIN/1.73M2 — SIGNIFICANT CHANGE UP
EOSINOPHIL # BLD AUTO: 0.05 K/UL — SIGNIFICANT CHANGE UP (ref 0–0.7)
EOSINOPHIL # BLD AUTO: 0.05 K/UL — SIGNIFICANT CHANGE UP (ref 0–0.7)
EOSINOPHIL NFR BLD AUTO: 0.5 % — SIGNIFICANT CHANGE UP (ref 0–8)
EOSINOPHIL NFR BLD AUTO: 0.5 % — SIGNIFICANT CHANGE UP (ref 0–8)
FLUAV AG NPH QL: SIGNIFICANT CHANGE UP
FLUAV AG NPH QL: SIGNIFICANT CHANGE UP
FLUBV AG NPH QL: SIGNIFICANT CHANGE UP
FLUBV AG NPH QL: SIGNIFICANT CHANGE UP
GLUCOSE BLDC GLUCOMTR-MCNC: 117 MG/DL — HIGH (ref 70–99)
GLUCOSE BLDC GLUCOMTR-MCNC: 117 MG/DL — HIGH (ref 70–99)
GLUCOSE SERPL-MCNC: 139 MG/DL — HIGH (ref 70–99)
GLUCOSE SERPL-MCNC: 139 MG/DL — HIGH (ref 70–99)
HCT VFR BLD CALC: 43 % — SIGNIFICANT CHANGE UP (ref 42–52)
HCT VFR BLD CALC: 43 % — SIGNIFICANT CHANGE UP (ref 42–52)
HGB BLD-MCNC: 13.7 G/DL — LOW (ref 14–18)
HGB BLD-MCNC: 13.7 G/DL — LOW (ref 14–18)
IMM GRANULOCYTES NFR BLD AUTO: 1.2 % — HIGH (ref 0.1–0.3)
IMM GRANULOCYTES NFR BLD AUTO: 1.2 % — HIGH (ref 0.1–0.3)
LYMPHOCYTES # BLD AUTO: 1.18 K/UL — LOW (ref 1.2–3.4)
LYMPHOCYTES # BLD AUTO: 1.18 K/UL — LOW (ref 1.2–3.4)
LYMPHOCYTES # BLD AUTO: 11.2 % — LOW (ref 20.5–51.1)
LYMPHOCYTES # BLD AUTO: 11.2 % — LOW (ref 20.5–51.1)
MAGNESIUM SERPL-MCNC: 1.7 MG/DL — LOW (ref 1.8–2.4)
MAGNESIUM SERPL-MCNC: 1.7 MG/DL — LOW (ref 1.8–2.4)
MCHC RBC-ENTMCNC: 28.9 PG — SIGNIFICANT CHANGE UP (ref 27–31)
MCHC RBC-ENTMCNC: 28.9 PG — SIGNIFICANT CHANGE UP (ref 27–31)
MCHC RBC-ENTMCNC: 31.9 G/DL — LOW (ref 32–37)
MCHC RBC-ENTMCNC: 31.9 G/DL — LOW (ref 32–37)
MCV RBC AUTO: 90.7 FL — SIGNIFICANT CHANGE UP (ref 80–94)
MCV RBC AUTO: 90.7 FL — SIGNIFICANT CHANGE UP (ref 80–94)
MONOCYTES # BLD AUTO: 0.54 K/UL — SIGNIFICANT CHANGE UP (ref 0.1–0.6)
MONOCYTES # BLD AUTO: 0.54 K/UL — SIGNIFICANT CHANGE UP (ref 0.1–0.6)
MONOCYTES NFR BLD AUTO: 5.1 % — SIGNIFICANT CHANGE UP (ref 1.7–9.3)
MONOCYTES NFR BLD AUTO: 5.1 % — SIGNIFICANT CHANGE UP (ref 1.7–9.3)
NEUTROPHILS # BLD AUTO: 8.55 K/UL — HIGH (ref 1.4–6.5)
NEUTROPHILS # BLD AUTO: 8.55 K/UL — HIGH (ref 1.4–6.5)
NEUTROPHILS NFR BLD AUTO: 81.4 % — HIGH (ref 42.2–75.2)
NEUTROPHILS NFR BLD AUTO: 81.4 % — HIGH (ref 42.2–75.2)
NRBC # BLD: 0 /100 WBCS — SIGNIFICANT CHANGE UP (ref 0–0)
NRBC # BLD: 0 /100 WBCS — SIGNIFICANT CHANGE UP (ref 0–0)
NT-PROBNP SERPL-SCNC: 1497 PG/ML — HIGH (ref 0–300)
NT-PROBNP SERPL-SCNC: 1497 PG/ML — HIGH (ref 0–300)
PLATELET # BLD AUTO: 201 K/UL — SIGNIFICANT CHANGE UP (ref 130–400)
PLATELET # BLD AUTO: 201 K/UL — SIGNIFICANT CHANGE UP (ref 130–400)
PMV BLD: 11.7 FL — HIGH (ref 7.4–10.4)
PMV BLD: 11.7 FL — HIGH (ref 7.4–10.4)
POTASSIUM SERPL-MCNC: 4.4 MMOL/L — SIGNIFICANT CHANGE UP (ref 3.5–5)
POTASSIUM SERPL-MCNC: 4.4 MMOL/L — SIGNIFICANT CHANGE UP (ref 3.5–5)
POTASSIUM SERPL-SCNC: 4.4 MMOL/L — SIGNIFICANT CHANGE UP (ref 3.5–5)
POTASSIUM SERPL-SCNC: 4.4 MMOL/L — SIGNIFICANT CHANGE UP (ref 3.5–5)
PROT SERPL-MCNC: 6.7 G/DL — SIGNIFICANT CHANGE UP (ref 6–8)
PROT SERPL-MCNC: 6.7 G/DL — SIGNIFICANT CHANGE UP (ref 6–8)
RBC # BLD: 4.74 M/UL — SIGNIFICANT CHANGE UP (ref 4.7–6.1)
RBC # BLD: 4.74 M/UL — SIGNIFICANT CHANGE UP (ref 4.7–6.1)
RBC # FLD: 14.2 % — SIGNIFICANT CHANGE UP (ref 11.5–14.5)
RBC # FLD: 14.2 % — SIGNIFICANT CHANGE UP (ref 11.5–14.5)
RSV RNA NPH QL NAA+NON-PROBE: SIGNIFICANT CHANGE UP
RSV RNA NPH QL NAA+NON-PROBE: SIGNIFICANT CHANGE UP
SARS-COV-2 RNA SPEC QL NAA+PROBE: SIGNIFICANT CHANGE UP
SARS-COV-2 RNA SPEC QL NAA+PROBE: SIGNIFICANT CHANGE UP
SODIUM SERPL-SCNC: 140 MMOL/L — SIGNIFICANT CHANGE UP (ref 135–146)
SODIUM SERPL-SCNC: 140 MMOL/L — SIGNIFICANT CHANGE UP (ref 135–146)
TROPONIN T SERPL-MCNC: <0.01 NG/ML — SIGNIFICANT CHANGE UP
TROPONIN T SERPL-MCNC: <0.01 NG/ML — SIGNIFICANT CHANGE UP
WBC # BLD: 10.51 K/UL — SIGNIFICANT CHANGE UP (ref 4.8–10.8)
WBC # BLD: 10.51 K/UL — SIGNIFICANT CHANGE UP (ref 4.8–10.8)
WBC # FLD AUTO: 10.51 K/UL — SIGNIFICANT CHANGE UP (ref 4.8–10.8)
WBC # FLD AUTO: 10.51 K/UL — SIGNIFICANT CHANGE UP (ref 4.8–10.8)

## 2023-11-26 PROCEDURE — 84145 PROCALCITONIN (PCT): CPT

## 2023-11-26 PROCEDURE — 85730 THROMBOPLASTIN TIME PARTIAL: CPT

## 2023-11-26 PROCEDURE — 80061 LIPID PANEL: CPT

## 2023-11-26 PROCEDURE — 80053 COMPREHEN METABOLIC PANEL: CPT

## 2023-11-26 PROCEDURE — 99291 CRITICAL CARE FIRST HOUR: CPT | Mod: GC

## 2023-11-26 PROCEDURE — 84443 ASSAY THYROID STIM HORMONE: CPT

## 2023-11-26 PROCEDURE — 93320 DOPPLER ECHO COMPLETE: CPT

## 2023-11-26 PROCEDURE — 71045 X-RAY EXAM CHEST 1 VIEW: CPT | Mod: 26

## 2023-11-26 PROCEDURE — 83036 HEMOGLOBIN GLYCOSYLATED A1C: CPT

## 2023-11-26 PROCEDURE — 84480 ASSAY TRIIODOTHYRONINE (T3): CPT

## 2023-11-26 PROCEDURE — 85610 PROTHROMBIN TIME: CPT

## 2023-11-26 PROCEDURE — 84436 ASSAY OF TOTAL THYROXINE: CPT

## 2023-11-26 PROCEDURE — 99223 1ST HOSP IP/OBS HIGH 75: CPT

## 2023-11-26 PROCEDURE — 93005 ELECTROCARDIOGRAM TRACING: CPT

## 2023-11-26 PROCEDURE — 93325 DOPPLER ECHO COLOR FLOW MAPG: CPT

## 2023-11-26 PROCEDURE — 0225U NFCT DS DNA&RNA 21 SARSCOV2: CPT

## 2023-11-26 PROCEDURE — 82962 GLUCOSE BLOOD TEST: CPT

## 2023-11-26 PROCEDURE — 93306 TTE W/DOPPLER COMPLETE: CPT

## 2023-11-26 PROCEDURE — 85025 COMPLETE CBC W/AUTO DIFF WBC: CPT

## 2023-11-26 PROCEDURE — 93308 TTE F-UP OR LMTD: CPT

## 2023-11-26 PROCEDURE — 93312 ECHO TRANSESOPHAGEAL: CPT

## 2023-11-26 PROCEDURE — 93010 ELECTROCARDIOGRAM REPORT: CPT | Mod: 76

## 2023-11-26 PROCEDURE — C9399: CPT

## 2023-11-26 PROCEDURE — 36415 COLL VENOUS BLD VENIPUNCTURE: CPT

## 2023-11-26 PROCEDURE — 92960 CARDIOVERSION ELECTRIC EXT: CPT

## 2023-11-26 PROCEDURE — 83735 ASSAY OF MAGNESIUM: CPT

## 2023-11-26 RX ORDER — HEPARIN SODIUM 5000 [USP'U]/ML
10000 INJECTION INTRAVENOUS; SUBCUTANEOUS EVERY 6 HOURS
Refills: 0 | Status: DISCONTINUED | OUTPATIENT
Start: 2023-11-26 | End: 2023-11-26

## 2023-11-26 RX ORDER — TRAZODONE HCL 50 MG
1 TABLET ORAL
Refills: 0 | DISCHARGE

## 2023-11-26 RX ORDER — LEVOTHYROXINE SODIUM 125 MCG
1.5 TABLET ORAL
Refills: 0 | DISCHARGE

## 2023-11-26 RX ORDER — MAGNESIUM SULFATE 500 MG/ML
2 VIAL (ML) INJECTION ONCE
Refills: 0 | Status: COMPLETED | OUTPATIENT
Start: 2023-11-26 | End: 2023-11-26

## 2023-11-26 RX ORDER — CEFTRIAXONE 500 MG/1
1000 INJECTION, POWDER, FOR SOLUTION INTRAMUSCULAR; INTRAVENOUS EVERY 24 HOURS
Refills: 0 | Status: DISCONTINUED | OUTPATIENT
Start: 2023-11-27 | End: 2023-11-27

## 2023-11-26 RX ORDER — TRAZODONE HCL 50 MG
150 TABLET ORAL AT BEDTIME
Refills: 0 | Status: DISCONTINUED | OUTPATIENT
Start: 2023-11-26 | End: 2023-11-27

## 2023-11-26 RX ORDER — LANOLIN ALCOHOL/MO/W.PET/CERES
3 CREAM (GRAM) TOPICAL AT BEDTIME
Refills: 0 | Status: DISCONTINUED | OUTPATIENT
Start: 2023-11-26 | End: 2023-11-27

## 2023-11-26 RX ORDER — LEVOTHYROXINE SODIUM 125 MCG
300 TABLET ORAL
Refills: 0 | Status: DISCONTINUED | OUTPATIENT
Start: 2023-11-26 | End: 2023-11-26

## 2023-11-26 RX ORDER — DILTIAZEM HCL 120 MG
20 CAPSULE, EXT RELEASE 24 HR ORAL ONCE
Refills: 0 | Status: COMPLETED | OUTPATIENT
Start: 2023-11-26 | End: 2023-11-26

## 2023-11-26 RX ORDER — GLUCAGON INJECTION, SOLUTION 0.5 MG/.1ML
1 INJECTION, SOLUTION SUBCUTANEOUS ONCE
Refills: 0 | Status: DISCONTINUED | OUTPATIENT
Start: 2023-11-26 | End: 2023-11-27

## 2023-11-26 RX ORDER — ONDANSETRON 8 MG/1
4 TABLET, FILM COATED ORAL EVERY 8 HOURS
Refills: 0 | Status: DISCONTINUED | OUTPATIENT
Start: 2023-11-26 | End: 2023-11-27

## 2023-11-26 RX ORDER — DEXTROSE 50 % IN WATER 50 %
12.5 SYRINGE (ML) INTRAVENOUS ONCE
Refills: 0 | Status: DISCONTINUED | OUTPATIENT
Start: 2023-11-26 | End: 2023-11-27

## 2023-11-26 RX ORDER — ARIPIPRAZOLE 15 MG/1
30 TABLET ORAL DAILY
Refills: 0 | Status: DISCONTINUED | OUTPATIENT
Start: 2023-11-26 | End: 2023-11-27

## 2023-11-26 RX ORDER — DULOXETINE HYDROCHLORIDE 30 MG/1
45 CAPSULE, DELAYED RELEASE ORAL
Qty: 0 | Refills: 0 | DISCHARGE

## 2023-11-26 RX ORDER — DIAZEPAM 5 MG
10 TABLET ORAL ONCE
Refills: 0 | Status: DISCONTINUED | OUTPATIENT
Start: 2023-11-26 | End: 2023-11-26

## 2023-11-26 RX ORDER — HEPARIN SODIUM 5000 [USP'U]/ML
10000 INJECTION INTRAVENOUS; SUBCUTANEOUS ONCE
Refills: 0 | Status: DISCONTINUED | OUTPATIENT
Start: 2023-11-26 | End: 2023-11-26

## 2023-11-26 RX ORDER — SERTRALINE 25 MG/1
200 TABLET, FILM COATED ORAL DAILY
Refills: 0 | Status: DISCONTINUED | OUTPATIENT
Start: 2023-11-26 | End: 2023-11-27

## 2023-11-26 RX ORDER — DULAGLUTIDE 4.5 MG/.5ML
0.75 INJECTION, SOLUTION SUBCUTANEOUS
Qty: 0 | Refills: 0 | DISCHARGE

## 2023-11-26 RX ORDER — ENOXAPARIN SODIUM 100 MG/ML
190 INJECTION SUBCUTANEOUS EVERY 12 HOURS
Refills: 0 | Status: DISCONTINUED | OUTPATIENT
Start: 2023-11-26 | End: 2023-11-26

## 2023-11-26 RX ORDER — METOPROLOL TARTRATE 50 MG
50 TABLET ORAL EVERY 8 HOURS
Refills: 0 | Status: DISCONTINUED | OUTPATIENT
Start: 2023-11-26 | End: 2023-11-27

## 2023-11-26 RX ORDER — ACETAMINOPHEN 500 MG
650 TABLET ORAL EVERY 6 HOURS
Refills: 0 | Status: DISCONTINUED | OUTPATIENT
Start: 2023-11-26 | End: 2023-11-27

## 2023-11-26 RX ORDER — SODIUM CHLORIDE 9 MG/ML
1000 INJECTION, SOLUTION INTRAVENOUS
Refills: 0 | Status: DISCONTINUED | OUTPATIENT
Start: 2023-11-26 | End: 2023-11-27

## 2023-11-26 RX ORDER — HEPARIN SODIUM 5000 [USP'U]/ML
INJECTION INTRAVENOUS; SUBCUTANEOUS
Qty: 25000 | Refills: 0 | Status: DISCONTINUED | OUTPATIENT
Start: 2023-11-26 | End: 2023-11-26

## 2023-11-26 RX ORDER — DEXTROSE 50 % IN WATER 50 %
15 SYRINGE (ML) INTRAVENOUS ONCE
Refills: 0 | Status: DISCONTINUED | OUTPATIENT
Start: 2023-11-26 | End: 2023-11-27

## 2023-11-26 RX ORDER — LEVOTHYROXINE SODIUM 125 MCG
300 TABLET ORAL
Refills: 0 | Status: DISCONTINUED | OUTPATIENT
Start: 2023-11-26 | End: 2023-11-27

## 2023-11-26 RX ORDER — CEFTRIAXONE 500 MG/1
1000 INJECTION, POWDER, FOR SOLUTION INTRAMUSCULAR; INTRAVENOUS ONCE
Refills: 0 | Status: COMPLETED | OUTPATIENT
Start: 2023-11-26 | End: 2023-11-26

## 2023-11-26 RX ORDER — MULTIVIT-MIN/FERROUS GLUCONATE 9 MG/15 ML
1 LIQUID (ML) ORAL
Qty: 0 | Refills: 0 | DISCHARGE

## 2023-11-26 RX ORDER — APIXABAN 2.5 MG/1
5 TABLET, FILM COATED ORAL EVERY 12 HOURS
Refills: 0 | Status: DISCONTINUED | OUTPATIENT
Start: 2023-11-27 | End: 2023-11-27

## 2023-11-26 RX ORDER — METFORMIN HYDROCHLORIDE 850 MG/1
1 TABLET ORAL
Qty: 0 | Refills: 0 | DISCHARGE

## 2023-11-26 RX ORDER — TRAZODONE HCL 50 MG
100 TABLET ORAL AT BEDTIME
Refills: 0 | Status: DISCONTINUED | OUTPATIENT
Start: 2023-11-26 | End: 2023-11-26

## 2023-11-26 RX ORDER — DEXTROSE 50 % IN WATER 50 %
25 SYRINGE (ML) INTRAVENOUS ONCE
Refills: 0 | Status: DISCONTINUED | OUTPATIENT
Start: 2023-11-26 | End: 2023-11-27

## 2023-11-26 RX ORDER — CEFTRIAXONE 500 MG/1
INJECTION, POWDER, FOR SOLUTION INTRAMUSCULAR; INTRAVENOUS
Refills: 0 | Status: DISCONTINUED | OUTPATIENT
Start: 2023-11-26 | End: 2023-11-27

## 2023-11-26 RX ORDER — INSULIN LISPRO 100/ML
VIAL (ML) SUBCUTANEOUS
Refills: 0 | Status: DISCONTINUED | OUTPATIENT
Start: 2023-11-26 | End: 2023-11-27

## 2023-11-26 RX ORDER — ARIPIPRAZOLE 15 MG/1
1.5 TABLET ORAL
Refills: 0 | DISCHARGE

## 2023-11-26 RX ORDER — SODIUM CHLORIDE 9 MG/ML
2000 INJECTION, SOLUTION INTRAVENOUS ONCE
Refills: 0 | Status: COMPLETED | OUTPATIENT
Start: 2023-11-26 | End: 2023-11-26

## 2023-11-26 RX ORDER — HEPARIN SODIUM 5000 [USP'U]/ML
5000 INJECTION INTRAVENOUS; SUBCUTANEOUS EVERY 6 HOURS
Refills: 0 | Status: DISCONTINUED | OUTPATIENT
Start: 2023-11-26 | End: 2023-11-26

## 2023-11-26 RX ORDER — CLONAZEPAM 1 MG
1 TABLET ORAL
Qty: 0 | Refills: 0 | DISCHARGE

## 2023-11-26 RX ORDER — DILTIAZEM HCL 120 MG
10 CAPSULE, EXT RELEASE 24 HR ORAL
Qty: 125 | Refills: 0 | Status: DISCONTINUED | OUTPATIENT
Start: 2023-11-26 | End: 2023-11-27

## 2023-11-26 RX ORDER — GEMFIBROZIL 600 MG
1000 TABLET ORAL
Qty: 0 | Refills: 0 | DISCHARGE

## 2023-11-26 RX ORDER — ZOLPIDEM TARTRATE 10 MG/1
1 TABLET ORAL
Qty: 0 | Refills: 0 | DISCHARGE

## 2023-11-26 RX ORDER — DILTIAZEM HCL 120 MG
15 CAPSULE, EXT RELEASE 24 HR ORAL
Qty: 125 | Refills: 0 | Status: DISCONTINUED | OUTPATIENT
Start: 2023-11-26 | End: 2023-11-26

## 2023-11-26 RX ORDER — PANTOPRAZOLE SODIUM 20 MG/1
40 TABLET, DELAYED RELEASE ORAL
Refills: 0 | Status: DISCONTINUED | OUTPATIENT
Start: 2023-11-26 | End: 2023-11-27

## 2023-11-26 RX ORDER — LEVOTHYROXINE SODIUM 125 MCG
450 TABLET ORAL
Refills: 0 | Status: DISCONTINUED | OUTPATIENT
Start: 2023-11-26 | End: 2023-11-27

## 2023-11-26 RX ORDER — ENOXAPARIN SODIUM 100 MG/ML
190 INJECTION SUBCUTANEOUS ONCE
Refills: 0 | Status: COMPLETED | OUTPATIENT
Start: 2023-11-26 | End: 2023-11-26

## 2023-11-26 RX ADMIN — Medication 20 MILLIGRAM(S): at 14:27

## 2023-11-26 RX ADMIN — Medication 15 MG/HR: at 22:30

## 2023-11-26 RX ADMIN — Medication 3 MILLIGRAM(S): at 21:28

## 2023-11-26 RX ADMIN — Medication 50 MILLIGRAM(S): at 18:48

## 2023-11-26 RX ADMIN — Medication 15 MG/HR: at 14:54

## 2023-11-26 RX ADMIN — Medication 25 GRAM(S): at 13:38

## 2023-11-26 RX ADMIN — Medication 2 GRAM(S): at 15:30

## 2023-11-26 RX ADMIN — Medication 100 MILLIGRAM(S): at 21:29

## 2023-11-26 RX ADMIN — SODIUM CHLORIDE 2000 MILLILITER(S): 9 INJECTION, SOLUTION INTRAVENOUS at 13:40

## 2023-11-26 RX ADMIN — CEFTRIAXONE 100 MILLIGRAM(S): 500 INJECTION, POWDER, FOR SOLUTION INTRAMUSCULAR; INTRAVENOUS at 21:29

## 2023-11-26 RX ADMIN — Medication 150 MILLIGRAM(S): at 21:28

## 2023-11-26 RX ADMIN — ENOXAPARIN SODIUM 190 MILLIGRAM(S): 100 INJECTION SUBCUTANEOUS at 16:11

## 2023-11-26 RX ADMIN — Medication 650 MILLIGRAM(S): at 18:51

## 2023-11-26 RX ADMIN — Medication 10 MILLIGRAM(S): at 14:53

## 2023-11-26 RX ADMIN — SODIUM CHLORIDE 2000 MILLILITER(S): 9 INJECTION, SOLUTION INTRAVENOUS at 12:41

## 2023-11-26 NOTE — H&P ADULT - NSHPLABSRESULTS_GEN_ALL_CORE
LABS:  cret                        13.7   10.51 )-----------( 201      ( 26 Nov 2023 12:55 )             43.0     11-26    140  |  105  |  17  ----------------------------<  139<H>  4.4   |  25  |  0.9    Ca    8.8      26 Nov 2023 12:55  Mg     1.7     11-26    TPro  6.7  /  Alb  3.9  /  TBili  0.7  /  DBili  x   /  AST  21  /  ALT  26  /  AlkPhos  71  11-26
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
## 2023-11-26 NOTE — H&P ADULT - NSHPPHYSICALEXAM_GEN_ALL_CORE
VITALS:   Vital Signs Last 24 Hrs  T(C): 36.9 (26 Nov 2023 12:21), Max: 36.9 (26 Nov 2023 12:21)  T(F): 98.5 (26 Nov 2023 12:21), Max: 98.5 (26 Nov 2023 12:21)  HR: 93 (26 Nov 2023 15:55) (93 - 151)  BP: 149/77 (26 Nov 2023 14:35) (149/77 - 178/104)  RR: 18 (26 Nov 2023 15:55) (18 - 20)  SpO2: 95% (26 Nov 2023 15:55) (95% - 99%)    Parameters below as of 26 Nov 2023 15:55  Patient On (Oxygen Delivery Method): room air      PHYSICAL EXAM:  General: WN/WD NAD  HEENT: PERRLA, EOMI, moist mucous membranes  Neurology: A&Ox3, nonfocal, HARDWICK x 4  Respiratory: CTA B/L, normal respiratory effort, no wheezes, crackles, rales  CV: RRR, S1S2, no murmurs, rubs or gallops  Abdominal: Soft, NT, ND +BS, Last BM  Extremities: No edema, + peripheral pulses VITALS:   Vital Signs Last 24 Hrs  T(C): 36.9 (26 Nov 2023 12:21), Max: 36.9 (26 Nov 2023 12:21)  T(F): 98.5 (26 Nov 2023 12:21), Max: 98.5 (26 Nov 2023 12:21)  HR: 93 (26 Nov 2023 15:55) (93 - 151)  BP: 149/77 (26 Nov 2023 14:35) (149/77 - 178/104)  RR: 18 (26 Nov 2023 15:55) (18 - 20)  SpO2: 95% (26 Nov 2023 15:55) (95% - 99%)    Parameters below as of 26 Nov 2023 15:55  Patient On (Oxygen Delivery Method): room air      PHYSICAL EXAM:  General: in NAD  HEENT: PERRLA, EOMI, moist mucous membranes  Neurology: A&Ox3  Respiratory: CTA B/L, normal respiratory effort, no wheezes, crackles, rales  CV: tachycardic, irregularly irregular, no murmurs appreciated  Abdominal: Soft, NT, ND, +BS, large panus, significant body habitus, no rebound or guarding  Extremities: No edema, + peripheral pulses

## 2023-11-26 NOTE — H&P ADULT - ATTENDING COMMENTS
Patient seen and examined at bedside independently of the residents. I read the resident's note and agree with the plan with the additions and corrections as noted below. My note supersedes the resident's note.     REVIEW OF SYSTEMS:  Negative except in HPI.     PMH:  Morbid obesity, HTN, HLD, DM II, s/p thyroidectomy with hypothyroidism, Anxiety/Depression, and s/p gastric sleeve surgery     FHx: Reviewed. No fhx of asthma/copd, No fhx of liver and pulmonary disease. No fhx of hematological disorder.     Physical Exam:  GEN: No acute distress. Awake, Alert and oriented x 3. Morbidly obese.   Head: Atraumatic, Normocephalic.   Eye: PEERLA. No sclera icterus. EOMI.   ENT: Normal oropharynx, no thyromegaly, no mass, no lymphadenopathy.   LUNGS: Clear to auscultation bilaterally. No wheeze/rales/crackles.   HEART: Normal. S1/S2 present. Irregularly irregular. No murmur/gallops.   ABD: Soft, non-tender, non-distended. Bowel sounds present.   EXT: No pitting edema. No erythema. No tenderness.  Integumentary: No rash, No sore, No petechia.   NEURO: CN III-XII intact. Strength: 5/5 b/l ULE. Sensory intact b/l ULE.     Vital Signs Last 24 Hrs  T(C): 36.6 (2023 19:11), Max: 36.9 (2023 12:21)  T(F): 97.9 (2023 19:11), Max: 98.5 (2023 12:21)  HR: 108 (2023 19:11) (93 - 151)  BP: 160/79 (2023 19:11) (149/77 - 178/104)  BP(mean): --  RR: 20 (2023 19:11) (18 - 20)  SpO2: 99% (2023 19:11) (95% - 99%)    Parameters below as of 2023 19:11  Patient On (Oxygen Delivery Method): room air      Please see the above notes for Labs and radiology.     Assessment and Plan:     47 yo M with hx of Morbid obesity, HTN, HLD, DM II, s/p thyroidectomy with hypothyroidism, Anxiety/Depression, and s/p gastric sleeve surgery presents to ED for SOB, cough and fatigue x 2 weeks, found to have new onset A.fib RVR.     New onset A.fib RVR   - EKG shows A.fib with HR ~ 159.   - s/p 2L LR bolus in ED.   - s/p Cardizem 20mg x 1, Lovenox 190 mg SC x 1 in ED  - c/w cardizem drip   - will start on lopressor 50 mg q8h   - check TTE and TSH  - NPO after MN for EBENEZER and Cardioversion in AM.   - EP consult.     CAP? 2/2 viral illness   - CXR shows Bibasilar opacities.  - will start on doxy and ceftriaxone   - f/u RVP, procalcitonin. atypical PNA panel   - supportive care.     HTN/HLD - c/w home med  DM II - monitor FS AC HS. Insulin regimen.   Hypothyroidism - check TSH. c/w synthroid.   MDD/Anxiety - c/w home med    DVT ppx: eliquis  GI ppx:  PPI  Diet: DASH/CC diet   Activity: as tolerated.     Date seen by the attendin2023  Total time spent: 75 minutes. Patient seen and examined at bedside independently of the residents. I read the resident's note and agree with the plan with the additions and corrections as noted below. My note supersedes the resident's note.     REVIEW OF SYSTEMS:  Negative except in HPI.     PMH:  Morbid obesity, HTN, HLD, DM II, s/p thyroidectomy with hypothyroidism, Anxiety/Depression, and s/p gastric sleeve surgery     FHx: Reviewed. No fhx of asthma/copd, No fhx of liver and pulmonary disease. No fhx of hematological disorder.     Physical Exam:  GEN: No acute distress. Awake, Alert and oriented x 3. Morbidly obese.   Head: Atraumatic, Normocephalic.   Eye: PEERLA. No sclera icterus. EOMI.   ENT: Normal oropharynx, no thyromegaly, no mass, no lymphadenopathy.   LUNGS: Clear to auscultation bilaterally. No wheeze/rales/crackles.   HEART: Normal. S1/S2 present. Irregularly irregular. No murmur/gallops.   ABD: Soft, non-tender, non-distended. Bowel sounds present.   EXT: No pitting edema. No erythema. No tenderness.  Integumentary: No rash, No sore, No petechia.   NEURO: CN III-XII intact. Strength: 5/5 b/l ULE. Sensory intact b/l ULE.     Vital Signs Last 24 Hrs  T(C): 36.6 (2023 19:11), Max: 36.9 (2023 12:21)  T(F): 97.9 (2023 19:11), Max: 98.5 (2023 12:21)  HR: 108 (2023 19:11) (93 - 151)  BP: 160/79 (2023 19:11) (149/77 - 178/104)  BP(mean): --  RR: 20 (2023 19:11) (18 - 20)  SpO2: 99% (2023 19:11) (95% - 99%)    Parameters below as of 2023 19:11  Patient On (Oxygen Delivery Method): room air      Please see the above notes for Labs and radiology.     Assessment and Plan:     49 yo M with hx of Morbid obesity, HTN, HLD, DM II, s/p thyroidectomy with hypothyroidism, Anxiety/Depression, and s/p gastric sleeve surgery presents to ED for SOB, cough and fatigue x 2 weeks, found to have new onset A.fib RVR.     New onset A.fib RVR   - EKG shows A.fib with HR ~ 159.   - s/p 2L LR bolus in ED.   - s/p Cardizem 20mg x 1, Lovenox 190 mg SC x 1 in ED  - c/w cardizem drip   - will start on lopressor 50 mg q8h   - check TTE and TSH  - NPO after MN for EBENEZER and Cardioversion in AM.   - EP consult.     CAP? 2/2 viral illness   - CXR shows Bibasilar opacities.  - will start on doxy and ceftriaxone   - f/u RVP, procalcitonin. atypical PNA panel   - supportive care.     HTN/HLD - c/w home med  DM II - monitor FS AC HS. Insulin regimen.   Hypothyroidism - check TSH. c/w synthroid.   MDD/Anxiety - c/w home med    DVT ppx: eliquis  GI ppx:  PPI  Diet: DASH/CC diet   Activity: as tolerated.     Date seen by the attendin2023  Total time spent: 75 minutes. Patient seen and examined at bedside independently of the residents. I read the resident's note and agree with the plan with the additions and corrections as noted below. My note supersedes the resident's note.     REVIEW OF SYSTEMS:  Negative except in HPI.     PMH:  Morbid obesity, HTN, HLD, DM II, s/p thyroidectomy with hypothyroidism, Anxiety/Depression, and s/p gastric sleeve surgery     FHx: Reviewed. No fhx of asthma/copd, No fhx of liver and pulmonary disease. No fhx of hematological disorder.     Physical Exam:  GEN: No acute distress. Awake, Alert and oriented x 3. Morbidly obese.   Head: Atraumatic, Normocephalic.   Eye: PEERLA. No sclera icterus. EOMI.   ENT: Normal oropharynx, no thyromegaly, no mass, no lymphadenopathy.   LUNGS: Clear to auscultation bilaterally. No wheeze/rales/crackles.   HEART: Normal. S1/S2 present. Irregularly irregular. No murmur/gallops.   ABD: Soft, non-tender, non-distended. Bowel sounds present.   EXT: No pitting edema. No erythema. No tenderness.  Integumentary: No rash, No sore, No petechia.   NEURO: CN III-XII intact. Strength: 5/5 b/l ULE. Sensory intact b/l ULE.     Vital Signs Last 24 Hrs  T(C): 36.6 (2023 19:11), Max: 36.9 (2023 12:21)  T(F): 97.9 (2023 19:11), Max: 98.5 (2023 12:21)  HR: 108 (2023 19:11) (93 - 151)  BP: 160/79 (2023 19:11) (149/77 - 178/104)  BP(mean): --  RR: 20 (2023 19:11) (18 - 20)  SpO2: 99% (2023 19:11) (95% - 99%)    Parameters below as of 2023 19:11  Patient On (Oxygen Delivery Method): room air      Please see the above notes for Labs and radiology.     Assessment and Plan:     49 yo M with hx of Morbid obesity, HTN, HLD, DM II, s/p thyroidectomy with hypothyroidism, Anxiety/Depression, and s/p gastric sleeve surgery presents to ED for SOB, cough and fatigue x 2 weeks, found to have new onset A.fib RVR.     New onset A.fib RVR   - EKG shows A.fib with HR ~ 159.   - s/p 2L LR bolus in ED.   - s/p Cardizem 20mg x 1, Lovenox 190 mg SC x 1 in ED  - Currently on Telemetry, patient HR still in A.fib ~ 65-70.   - c/w cardizem drip for now.   - will start on lopressor 50 mg q8h   - check TTE and TSH  - NPO after MN for EBENEZER and Cardioversion in AM.   - EP consult.     CAP? 2/2 viral illness   - CXR shows Bibasilar opacities.  - will start on doxy and ceftriaxone   - f/u RVP, procalcitonin. atypical PNA panel   - supportive care.     Morbid obesity with suspected TEJAS - BiPAP qhs.   HTN/HLD - c/w home med  DM II - monitor FS AC HS. Insulin regimen.   Hypothyroidism - check TSH. c/w synthroid.   MDD/Anxiety - c/w home med    DVT ppx: eliquis  GI ppx:  PPI  Diet: DASH/CC diet   Activity: as tolerated.     Date seen by the attendin2023  Total time spent: 75 minutes. Patient seen and examined at bedside independently of the residents. I read the resident's note and agree with the plan with the additions and corrections as noted below. My note supersedes the resident's note.     REVIEW OF SYSTEMS:  Negative except in HPI.     PMH:  Morbid obesity, HTN, HLD, DM II, s/p thyroidectomy with hypothyroidism, Anxiety/Depression, and s/p gastric sleeve surgery     FHx: Reviewed. No fhx of asthma/copd, No fhx of liver and pulmonary disease. No fhx of hematological disorder.     Physical Exam:  GEN: No acute distress. Awake, Alert and oriented x 3. Morbidly obese.   Head: Atraumatic, Normocephalic.   Eye: PEERLA. No sclera icterus. EOMI.   ENT: Normal oropharynx, no thyromegaly, no mass, no lymphadenopathy.   LUNGS: Clear to auscultation bilaterally. No wheeze/rales/crackles.   HEART: Normal. S1/S2 present. Irregularly irregular. No murmur/gallops.   ABD: Soft, non-tender, non-distended. Bowel sounds present.   EXT: No pitting edema. No erythema. No tenderness.  Integumentary: No rash, No sore, No petechia.   NEURO: CN III-XII intact. Strength: 5/5 b/l ULE. Sensory intact b/l ULE.     Vital Signs Last 24 Hrs  T(C): 36.6 (2023 19:11), Max: 36.9 (2023 12:21)  T(F): 97.9 (2023 19:11), Max: 98.5 (2023 12:21)  HR: 108 (2023 19:11) (93 - 151)  BP: 160/79 (2023 19:11) (149/77 - 178/104)  BP(mean): --  RR: 20 (2023 19:11) (18 - 20)  SpO2: 99% (2023 19:11) (95% - 99%)    Parameters below as of 2023 19:11  Patient On (Oxygen Delivery Method): room air      Please see the above notes for Labs and radiology.     Assessment and Plan:     47 yo M with hx of Morbid obesity, HTN, HLD, DM II, s/p thyroidectomy with hypothyroidism, Anxiety/Depression, and s/p gastric sleeve surgery presents to ED for SOB, cough and fatigue x 2 weeks, found to have new onset A.fib RVR.     New onset A.fib RVR   - EKG shows A.fib with HR ~ 159.   - s/p 2L LR bolus in ED.   - s/p Cardizem 20mg x 1, Lovenox 190 mg SC x 1 in ED  - Currently on Telemetry, patient HR still in A.fib ~ 65-70.   - c/w cardizem drip for now.   - will start on lopressor 50 mg q8h   - check TTE and TSH  - NPO after MN for EBENEZER and Cardioversion in AM.   - EP consult.     CAP? 2/2 viral illness   - CXR shows Bibasilar opacities.  - will start on doxy and ceftriaxone   - f/u RVP, procalcitonin. atypical PNA panel   - supportive care.     Morbid obesity with suspected TEJAS - BiPAP qhs.   HTN/HLD - c/w home med  DM II - monitor FS AC HS. Insulin regimen.   Hypothyroidism - check TSH. c/w synthroid.   MDD/Anxiety - c/w home med    DVT ppx: eliquis  GI ppx:  PPI  Diet: DASH/CC diet   Activity: as tolerated.     Date seen by the attendin2023  Total time spent: 75 minutes. Patient seen and examined at bedside independently of the residents. I read the resident's note and agree with the plan with the additions and corrections as noted below. My note supersedes the resident's note.     REVIEW OF SYSTEMS:  Negative except in HPI.     PMH:  Morbid obesity, HTN, HLD, DM II, s/p thyroidectomy with hypothyroidism, Anxiety/Depression, and s/p gastric sleeve surgery     FHx: Reviewed. No fhx of asthma/copd, No fhx of liver and pulmonary disease. No fhx of hematological disorder.     Physical Exam:  GEN: No acute distress. Awake, Alert and oriented x 3. Morbidly obese.   Head: Atraumatic, Normocephalic.   Eye: PEERLA. No sclera icterus. EOMI.   ENT: Normal oropharynx, no thyromegaly, no mass, no lymphadenopathy.   LUNGS: Clear to auscultation bilaterally. No wheeze/rales/crackles.   HEART: Normal. S1/S2 present. Irregularly irregular. No murmur/gallops.   ABD: Soft, non-tender, non-distended. Bowel sounds present.   EXT: 1+ pitting edema. No erythema. No tenderness.  Integumentary: No rash, No sore, No petechia.   NEURO: CN III-XII intact. Strength: 5/5 b/l ULE. Sensory intact b/l ULE.     Vital Signs Last 24 Hrs  T(C): 36.6 (2023 19:11), Max: 36.9 (2023 12:21)  T(F): 97.9 (2023 19:11), Max: 98.5 (2023 12:21)  HR: 108 (2023 19:11) (93 - 151)  BP: 160/79 (2023 19:11) (149/77 - 178/104)  BP(mean): --  RR: 20 (2023 19:11) (18 - 20)  SpO2: 99% (2023 19:11) (95% - 99%)    Parameters below as of 2023 19:11  Patient On (Oxygen Delivery Method): room air      Please see the above notes for Labs and radiology.     Assessment and Plan:     47 yo M with hx of Morbid obesity, HTN, HLD, DM II, s/p thyroidectomy with hypothyroidism, Anxiety/Depression, and s/p gastric sleeve surgery presents to ED for SOB, cough and fatigue x 2 weeks, found to have new onset A.fib RVR.     New onset A.fib RVR   - EKG shows A.fib with HR ~ 159.   - s/p 2L LR bolus in ED.   - s/p Cardizem 20mg x 1, Lovenox 190 mg SC x 1 in ED  - Currently on Telemetry, patient HR still in A.fib ~ 65-70.   - c/w cardizem drip for now.   - will start on lopressor 50 mg q8h   - check TTE and TSH  - NPO after MN for EBENEZER and Cardioversion in AM.   - EP consult.     CAP? 2/2 viral illness   - CXR shows Bibasilar opacities.  - will start on doxy and ceftriaxone   - f/u RVP, procalcitonin. atypical PNA panel   - supportive care.     Morbid obesity with suspected TEJAS - BiPAP qhs.   HTN/HLD - c/w home med  DM II - monitor FS AC HS. Insulin regimen.   Hypothyroidism - check TSH. c/w synthroid.   MDD/Anxiety - c/w home med    DVT ppx: eliquis  GI ppx:  PPI  Diet: DASH/CC diet   Activity: as tolerated.     Date seen by the attendin2023  Total time spent: 75 minutes. Patient seen and examined at bedside independently of the residents. I read the resident's note and agree with the plan with the additions and corrections as noted below. My note supersedes the resident's note.     REVIEW OF SYSTEMS:  Negative except in HPI.     PMH:  Morbid obesity, HTN, HLD, DM II, s/p thyroidectomy with hypothyroidism, Anxiety/Depression, and s/p gastric sleeve surgery     FHx: Reviewed. No fhx of asthma/copd, No fhx of liver and pulmonary disease. No fhx of hematological disorder.     Physical Exam:  GEN: No acute distress. Awake, Alert and oriented x 3. Morbidly obese.   Head: Atraumatic, Normocephalic.   Eye: PEERLA. No sclera icterus. EOMI.   ENT: Normal oropharynx, no thyromegaly, no mass, no lymphadenopathy.   LUNGS: Clear to auscultation bilaterally. No wheeze/rales/crackles.   HEART: Normal. S1/S2 present. Irregularly irregular. No murmur/gallops.   ABD: Soft, non-tender, non-distended. Bowel sounds present.   EXT: 1+ pitting edema. No erythema. No tenderness.  Integumentary: No rash, No sore, No petechia.   NEURO: CN III-XII intact. Strength: 5/5 b/l ULE. Sensory intact b/l ULE.     Vital Signs Last 24 Hrs  T(C): 36.6 (2023 19:11), Max: 36.9 (2023 12:21)  T(F): 97.9 (2023 19:11), Max: 98.5 (2023 12:21)  HR: 108 (2023 19:11) (93 - 151)  BP: 160/79 (2023 19:11) (149/77 - 178/104)  BP(mean): --  RR: 20 (2023 19:11) (18 - 20)  SpO2: 99% (2023 19:11) (95% - 99%)    Parameters below as of 2023 19:11  Patient On (Oxygen Delivery Method): room air      Please see the above notes for Labs and radiology.     Assessment and Plan:     49 yo M with hx of Morbid obesity, HTN, HLD, DM II, s/p thyroidectomy with hypothyroidism, Anxiety/Depression, and s/p gastric sleeve surgery presents to ED for SOB, cough and fatigue x 2 weeks, found to have new onset A.fib RVR.     New onset A.fib RVR   - EKG shows A.fib with HR ~ 159.   - s/p 2L LR bolus in ED.   - s/p Cardizem 20mg x 1, Lovenox 190 mg SC x 1 in ED  - Currently on Telemetry, patient HR still in A.fib ~ 65-70.   - c/w cardizem drip for now.   - will start on lopressor 50 mg q8h   - check TTE and TSH  - NPO after MN for EBENEZER and Cardioversion in AM.   - EP consult.     CAP? 2/2 viral illness   - CXR shows Bibasilar opacities.  - will start on doxy and ceftriaxone   - f/u RVP, procalcitonin. atypical PNA panel   - supportive care.     Morbid obesity with suspected TEJAS - BiPAP qhs.   HTN/HLD - c/w home med  DM II - monitor FS AC HS. Insulin regimen.   Hypothyroidism - check TSH. c/w synthroid.   MDD/Anxiety - c/w home med    DVT ppx: eliquis  GI ppx:  PPI  Diet: DASH/CC diet   Activity: as tolerated.     Date seen by the attendin2023  Total time spent: 75 minutes.

## 2023-11-26 NOTE — ED PROVIDER NOTE - CLINICAL SUMMARY MEDICAL DECISION MAKING FREE TEXT BOX
48-year-old male history of acquired hypothyroidism status post thyroidectomy, diabetes dyslipidemia gastric sleeve presenting for evaluation of generalized weakness, cough, shortness of breath.  No chest pain.  No other acute complaints.  Well appearing, NAD, non toxic. NCAT PERRLA EOMI neck supple non tender normal wob cta bl irregular, tachycardic abdomen s nt nd no rebound no guarding [id number] neuro non focal.  Labs EKG imaging reviewed.  Patient found to be in new onset atrial fibrillation with RVR.  No improvement with fluids.  Given Cardizem push with improvement.  Repeat EKG showing A-fib RVR.  Patient started on Cardizem infusion, otherwise hemodynamically stable.  Lovenox given.  Will admit for further evaluation.

## 2023-11-26 NOTE — H&P ADULT - HISTORY OF PRESENT ILLNESS
48M w/ PMHx DM, s/p gastric sleeve surgery, s/p thyroidectomy on levothyroxine 300mcg daily presenting for evaluation of shortness of breath, cough and fatigue over the last 2 weeks. Patient does not regularly follow-up with cardiologist, he last saw a cardiologist (Dr. Lora) 1.5 years ago for preop testing and everything was normal at that time. Patient denies any headaches, fevers, chills, chest pain, palpitations, n/v/d. abdominal pain, dysuria or recent travel. 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    Vitals: Temp 98.5F, /104, , RR 20, SpO2 96%    Labs:     Imaging:    In the ED:  - s/p 2L LR bolus  - s/p Cardizem 20mg IV x1  - s/p Mag 2g IV x1  - s/p Diazepam 10mg PO x1  - s/p Lovenox 190mg SubQ x1    Admitted to medicine for newly diagnosed a-fib. 48M w/ PMHx DM, s/p gastric sleeve surgery, s/p thyroidectomy on levothyroxine 300mcg daily presenting for evaluation of shortness of breath, cough and fatigue over the last 2 weeks. Patient does not regularly follow-up with cardiologist, he last saw a cardiologist (Dr. Lora) 1.5 years ago for preop testing and everything was normal at that time. Patient denies any headaches, fevers, chills, chest pain, palpitations, n/v/d. abdominal pain, dysuria or recent travel. 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    Vitals: Temp 98.5F, /104, , RR 20, SpO2 96%    Labs: Hgb 13.7 (no previous baseline), Mg 1.7, BNP 1497, Trop -ve x1    EKG shows AFib w/ RVR    Imaging: CXR shows bibasilar opacities    In the ED:  - s/p 2L LR bolus  - s/p Cardizem 20mg IV x1  - s/p Mag 2g IV x1  - s/p Diazepam 10mg PO x1  - s/p Lovenox 190mg SubQ x1    Admitted to medicine for newly diagnosed a-fib. 47y/o morbidly obese M w/ PMHx DM, HTN, DLD, hypothyroidism s/p thyroidectomy, anxiety, depression and s/p gastric sleeve surgery presenting for evaluation of shortness of breath, cough and fatigue over the last 2 weeks. Patient does not regularly follow-up with cardiologist, he last saw a cardiologist (Dr. Lora) 1.5 years ago for preop testing and everything was normal at that time. Patient denies any headaches, fevers, chills, chest pain, palpitations, n/v/d. abdominal pain, dysuria or recent travel. 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    Vitals: Temp 98.5F, /104, , RR 20, SpO2 96%    Labs: Hgb 13.7 (no previous baseline), Mg 1.7, BNP 1497, Trop -ve x1    EKG shows AFib w/ RVR    Imaging: CXR shows bibasilar opacities    In the ED:  - s/p 2L LR bolus  - s/p Cardizem 20mg IV x1  - s/p Mag 2g IV x1  - s/p Diazepam 10mg PO x1  - s/p Lovenox 190mg SubQ x1    Admitted to medicine for newly diagnosed a-fib.

## 2023-11-26 NOTE — ED PROVIDER NOTE - OBJECTIVE STATEMENT
Patient is a 48-year-old male with past medical history of DM, gastric sleeve surgery, status post thyroidectomy on levothyroxine 300 mg daily, presenting for evaluation of shortness of breath, cough, fatigue over the last 2 weeks.  Patient does not regularly follow-up with cardiologist, he last saw a cardiologist (Dr. Lora) 1.5 years ago for preop testing and everything was normal at that time. Patient denies any fevers, chills, swelling, chest pain, palpitations, recent travel.  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

## 2023-11-26 NOTE — ED PROVIDER NOTE - PHYSICAL EXAMINATION
VITAL SIGNS: I have reviewed nursing notes and confirm.  CONSTITUTIONAL: well-appearing, non-toxic, NAD  SKIN: Warm dry, normal skin turgor  HEAD: NCAT  EYES: EOMI, PERRLA, no scleral icterus  ENT: Moist mucous membranes, normal pharynx with no erythema or exudates. (+) nasal congestion  NECK: Supple; non tender. Full ROM. No cervical LAD  CARD: () tachycardia, but regular rhythm, no murmurs, rubs or gallops  RESP: clear to ausculation b/l.  No rales, rhonchi, or wheezing.  ABD: soft, + BS, non-tender, non-distended, no rebound or guarding.   EXT: Full ROM, no bony tenderness, no pedal edema, no calf tenderness  NEURO: normal motor. normal sensory. Normal gait.  PSYCH: Cooperative, appropriate.

## 2023-11-26 NOTE — H&P ADULT - ASSESSMENT
48M w/ PMHx DM, s/p gastric sleeve surgery, s/p thyroidectomy on levothyroxine 300mcg daily presenting for evaluation of shortness of breath, cough and fatigue over the last 2 weeks.    #Newly Diagnosed pA-Fib  -     #T2DM  -     #Hypothyroidism s/p Thyroidectomy  - on levothyroxine 300mcg daily  - f/u TSH/T4/T3    #s/p Gastric Sleeve Surgery  - f/u o/p    #DVT ppx:  #GI ppx:  #Diet:  #Activity:  #Code Status:  #Dispo: from home, acute   48M w/ PMHx DM, s/p gastric sleeve surgery, s/p thyroidectomy on levothyroxine 300mcg daily presenting for evaluation of shortness of breath, cough and fatigue over the last 2 weeks.    #Newly Diagnosed pA-Fib  - EKG showed Afib w/ RVR  - s/p Cardizem 20mg IV x1  - s/p Lovenox 190mg SubQ x1  - currently on Cardizem gtt  - f/u EP for possible cardioversion    #T2DM  - monitor FS  - ISS for now    #Hypothyroidism s/p Thyroidectomy  - on levothyroxine 300mcg daily  - f/u TSH/T4/T3    #s/p Gastric Sleeve Surgery  - f/u o/p    #DVT ppx:  #GI ppx:  #Diet:  #Activity:  #Code Status:  #Dispo: from home, acute   49y/o morbidly obese M w/ PMHx DM, HTN, DLD, hypothyroidism s/p thyroidectomy, anxiety, depression and s/p gastric sleeve surgery presenting for evaluation of shortness of breath, cough and fatigue over the last 2 weeks, found to be in afib w/ rvr in the ED. Admitted to telemetry for further management.    #Newly Diagnosed A-Fib  #HTN  - EKG showed Afib w/ RVR  - BP not controlled - not on any home medications  - AFib likely 2/2 possible TEJAS and/or high synthroid dosage - sleep study outpatient and f/u Thyroid Panel  - s/p Cardizem 20mg IV x1  - s/p Lovenox 190mg SubQ x1  - currently on Cardizem gtt  - starting Metoprolol Tartrate 50mg q8hrs PO  - c/w Lovenox AC for now  - f/u EP cards for EBENEZER/Cardioversion in the AM  - NPO after midnight    #Hypothyroidism s/p Thyroidectomy  - on levothyroxine PO alternating between 450mcg (MWF) and 300mcg (all the other days) - no specific dose as per patient  - f/u TSH/T4/T3  - adjust levothyroxine dosage based on thyroid panel - consider endo consult    #T2DM  - was previously on Metformin and Trulicity  - monitor FS  - ISS for now  - adjust insulin based on 24 hours ISS requirement  - f/u A1C    #DLD  - was previously on Gemfibrozil  - f/u lipid panel    #Anxiety  #Depression  #Insomnia  - c/w home Zoloft 200mg qD  - c/w Abilify 30mg qD  - c/w Trazodone 250mg at bedtime    #s/p Gastric Sleeve Surgery  - f/u o/p    #DVT ppx: Lovenox Full AC  #GI ppx: PPI PO daily  #Diet: Regular - DASH/TLC/CC  #Activity: IAT  #Code Status: Full Code  #Dispo: from home, admit to tele, acute 47y/o morbidly obese M w/ PMHx DM, HTN, DLD, hypothyroidism s/p thyroidectomy, anxiety, depression and s/p gastric sleeve surgery presenting for evaluation of shortness of breath, cough and fatigue over the last 2 weeks, found to be in afib w/ rvr in the ED. Admitted to telemetry for further management.    #Newly Diagnosed A-Fib  #HTN  - EKG showed Afib w/ RVR  - BP not controlled - not on any home medications  - AFib likely 2/2 possible TEJAS and/or high synthroid dosage - sleep study outpatient and f/u Thyroid Panel  - s/p Cardizem 20mg IV x1  - s/p Lovenox 190mg SubQ x1  - currently on Cardizem gtt  - starting Metoprolol Tartrate 50mg q8hrs PO  - c/w Lovenox AC for now  - f/u EP cards for EBENEZER/Cardioversion in the AM  - NPO after midnight    #Hypothyroidism s/p Thyroidectomy  - on levothyroxine PO alternating between 450mcg (MWF) and 300mcg (all the other days) - no specific dose as per patient  - f/u TSH/T4/T3  - adjust levothyroxine dosage based on thyroid panel - consider endo consult    #T2DM  - was previously on Metformin and Trulicity  - monitor FS  - ISS for now  - adjust insulin based on 24 hours ISS requirement  - f/u A1C    #DLD  - was previously on Gemfibrozil  - f/u lipid panel    #Anxiety  #Depression  #Insomnia  - c/w home Zoloft 200mg qD  - c/w Abilify 30mg qD  - c/w Trazodone 250mg at bedtime    #s/p Gastric Sleeve Surgery  - f/u o/p    #DVT ppx: Lovenox Full AC  #GI ppx: PPI PO daily  #Diet: Regular - DASH/TLC/CC  #Activity: IAT  #Code Status: Full Code  #Dispo: from home, admit to tele, acute 47y/o morbidly obese M w/ PMHx DM, HTN, DLD, hypothyroidism s/p thyroidectomy, anxiety, depression and s/p gastric sleeve surgery presenting for evaluation of shortness of breath, cough and fatigue over the last 2 weeks, found to be in afib w/ rvr in the ED. Admitted to telemetry for further management.    #Newly Diagnosed A-Fib  #HTN  - EKG showed Afib w/ RVR  - BP not controlled - not on any home medications  - AFib likely 2/2 possible TEJAS and/or high synthroid dosage - sleep study outpatient and f/u Thyroid Panel  - s/p Cardizem 20mg IV x1  - s/p Lovenox 190mg SubQ x1  - currently on Cardizem gtt  - starting Metoprolol Tartrate 50mg q8hrs PO  - c/w Heparin gtt for AC, lovenox dose max at 150mg per pharmacy  - f/u EP cards for EBENEZER/Cardioversion in the AM  - NPO after midnight    #Hypothyroidism s/p Thyroidectomy  - on levothyroxine PO alternating between 450mcg (MWF) and 300mcg (all the other days) - no specific dose as per patient  - f/u TSH/T4/T3  - adjust levothyroxine dosage based on thyroid panel - consider endo consult    #T2DM  - was previously on Metformin and Trulicity  - monitor FS  - ISS for now  - adjust insulin based on 24 hours ISS requirement  - f/u A1C    #DLD  - was previously on Gemfibrozil  - f/u lipid panel    #Anxiety  #Depression  #Insomnia  - c/w home Zoloft 200mg qD  - c/w Abilify 30mg qD  - c/w Trazodone 250mg at bedtime    #s/p Gastric Sleeve Surgery  - f/u o/p    #DVT ppx: Heparin gtt  #GI ppx: PPI PO daily  #Diet: Regular - DASH/TLC/CC  #Activity: IAT  #Code Status: Full Code  #Dispo: from home, admit to tele, acute

## 2023-11-27 ENCOUNTER — TRANSCRIPTION ENCOUNTER (OUTPATIENT)
Age: 48
End: 2023-11-27

## 2023-11-27 VITALS
SYSTOLIC BLOOD PRESSURE: 133 MMHG | RESPIRATION RATE: 18 BRPM | TEMPERATURE: 99 F | DIASTOLIC BLOOD PRESSURE: 70 MMHG | HEART RATE: 87 BPM

## 2023-11-27 LAB
A1C WITH ESTIMATED AVERAGE GLUCOSE RESULT: 6.4 % — HIGH (ref 4–5.6)
A1C WITH ESTIMATED AVERAGE GLUCOSE RESULT: 6.4 % — HIGH (ref 4–5.6)
ALBUMIN SERPL ELPH-MCNC: 3.9 G/DL — SIGNIFICANT CHANGE UP (ref 3.5–5.2)
ALBUMIN SERPL ELPH-MCNC: 3.9 G/DL — SIGNIFICANT CHANGE UP (ref 3.5–5.2)
ALP SERPL-CCNC: 69 U/L — SIGNIFICANT CHANGE UP (ref 30–115)
ALP SERPL-CCNC: 69 U/L — SIGNIFICANT CHANGE UP (ref 30–115)
ALT FLD-CCNC: 19 U/L — SIGNIFICANT CHANGE UP (ref 0–41)
ALT FLD-CCNC: 19 U/L — SIGNIFICANT CHANGE UP (ref 0–41)
ANION GAP SERPL CALC-SCNC: 8 MMOL/L — SIGNIFICANT CHANGE UP (ref 7–14)
ANION GAP SERPL CALC-SCNC: 8 MMOL/L — SIGNIFICANT CHANGE UP (ref 7–14)
APTT BLD: 36.7 SEC — SIGNIFICANT CHANGE UP (ref 27–39.2)
APTT BLD: 36.7 SEC — SIGNIFICANT CHANGE UP (ref 27–39.2)
AST SERPL-CCNC: 17 U/L — SIGNIFICANT CHANGE UP (ref 0–41)
AST SERPL-CCNC: 17 U/L — SIGNIFICANT CHANGE UP (ref 0–41)
BASOPHILS # BLD AUTO: 0.07 K/UL — SIGNIFICANT CHANGE UP (ref 0–0.2)
BASOPHILS # BLD AUTO: 0.07 K/UL — SIGNIFICANT CHANGE UP (ref 0–0.2)
BASOPHILS NFR BLD AUTO: 0.9 % — SIGNIFICANT CHANGE UP (ref 0–1)
BASOPHILS NFR BLD AUTO: 0.9 % — SIGNIFICANT CHANGE UP (ref 0–1)
BILIRUB SERPL-MCNC: 0.9 MG/DL — SIGNIFICANT CHANGE UP (ref 0.2–1.2)
BILIRUB SERPL-MCNC: 0.9 MG/DL — SIGNIFICANT CHANGE UP (ref 0.2–1.2)
BUN SERPL-MCNC: 18 MG/DL — SIGNIFICANT CHANGE UP (ref 10–20)
BUN SERPL-MCNC: 18 MG/DL — SIGNIFICANT CHANGE UP (ref 10–20)
CALCIUM SERPL-MCNC: 9 MG/DL — SIGNIFICANT CHANGE UP (ref 8.4–10.4)
CALCIUM SERPL-MCNC: 9 MG/DL — SIGNIFICANT CHANGE UP (ref 8.4–10.4)
CHLORIDE SERPL-SCNC: 107 MMOL/L — SIGNIFICANT CHANGE UP (ref 98–110)
CHLORIDE SERPL-SCNC: 107 MMOL/L — SIGNIFICANT CHANGE UP (ref 98–110)
CHOLEST SERPL-MCNC: 154 MG/DL — SIGNIFICANT CHANGE UP
CHOLEST SERPL-MCNC: 154 MG/DL — SIGNIFICANT CHANGE UP
CO2 SERPL-SCNC: 25 MMOL/L — SIGNIFICANT CHANGE UP (ref 17–32)
CO2 SERPL-SCNC: 25 MMOL/L — SIGNIFICANT CHANGE UP (ref 17–32)
CREAT SERPL-MCNC: 0.9 MG/DL — SIGNIFICANT CHANGE UP (ref 0.7–1.5)
CREAT SERPL-MCNC: 0.9 MG/DL — SIGNIFICANT CHANGE UP (ref 0.7–1.5)
EGFR: 105 ML/MIN/1.73M2 — SIGNIFICANT CHANGE UP
EGFR: 105 ML/MIN/1.73M2 — SIGNIFICANT CHANGE UP
EOSINOPHIL # BLD AUTO: 0.1 K/UL — SIGNIFICANT CHANGE UP (ref 0–0.7)
EOSINOPHIL # BLD AUTO: 0.1 K/UL — SIGNIFICANT CHANGE UP (ref 0–0.7)
EOSINOPHIL NFR BLD AUTO: 1.3 % — SIGNIFICANT CHANGE UP (ref 0–8)
EOSINOPHIL NFR BLD AUTO: 1.3 % — SIGNIFICANT CHANGE UP (ref 0–8)
ESTIMATED AVERAGE GLUCOSE: 137 MG/DL — HIGH (ref 68–114)
ESTIMATED AVERAGE GLUCOSE: 137 MG/DL — HIGH (ref 68–114)
GLUCOSE BLDC GLUCOMTR-MCNC: 110 MG/DL — HIGH (ref 70–99)
GLUCOSE BLDC GLUCOMTR-MCNC: 110 MG/DL — HIGH (ref 70–99)
GLUCOSE BLDC GLUCOMTR-MCNC: 116 MG/DL — HIGH (ref 70–99)
GLUCOSE BLDC GLUCOMTR-MCNC: 116 MG/DL — HIGH (ref 70–99)
GLUCOSE BLDC GLUCOMTR-MCNC: 90 MG/DL — SIGNIFICANT CHANGE UP (ref 70–99)
GLUCOSE BLDC GLUCOMTR-MCNC: 90 MG/DL — SIGNIFICANT CHANGE UP (ref 70–99)
GLUCOSE SERPL-MCNC: 104 MG/DL — HIGH (ref 70–99)
GLUCOSE SERPL-MCNC: 104 MG/DL — HIGH (ref 70–99)
HCT VFR BLD CALC: 38.7 % — LOW (ref 42–52)
HCT VFR BLD CALC: 38.7 % — LOW (ref 42–52)
HDLC SERPL-MCNC: 27 MG/DL — LOW
HDLC SERPL-MCNC: 27 MG/DL — LOW
HGB BLD-MCNC: 12.4 G/DL — LOW (ref 14–18)
HGB BLD-MCNC: 12.4 G/DL — LOW (ref 14–18)
IMM GRANULOCYTES NFR BLD AUTO: 1.3 % — HIGH (ref 0.1–0.3)
IMM GRANULOCYTES NFR BLD AUTO: 1.3 % — HIGH (ref 0.1–0.3)
INR BLD: 1.26 RATIO — SIGNIFICANT CHANGE UP (ref 0.65–1.3)
INR BLD: 1.26 RATIO — SIGNIFICANT CHANGE UP (ref 0.65–1.3)
LIPID PNL WITH DIRECT LDL SERPL: 102 MG/DL — HIGH
LIPID PNL WITH DIRECT LDL SERPL: 102 MG/DL — HIGH
LYMPHOCYTES # BLD AUTO: 1.5 K/UL — SIGNIFICANT CHANGE UP (ref 1.2–3.4)
LYMPHOCYTES # BLD AUTO: 1.5 K/UL — SIGNIFICANT CHANGE UP (ref 1.2–3.4)
LYMPHOCYTES # BLD AUTO: 19.7 % — LOW (ref 20.5–51.1)
LYMPHOCYTES # BLD AUTO: 19.7 % — LOW (ref 20.5–51.1)
MAGNESIUM SERPL-MCNC: 2.1 MG/DL — SIGNIFICANT CHANGE UP (ref 1.8–2.4)
MAGNESIUM SERPL-MCNC: 2.1 MG/DL — SIGNIFICANT CHANGE UP (ref 1.8–2.4)
MCHC RBC-ENTMCNC: 29 PG — SIGNIFICANT CHANGE UP (ref 27–31)
MCHC RBC-ENTMCNC: 29 PG — SIGNIFICANT CHANGE UP (ref 27–31)
MCHC RBC-ENTMCNC: 32 G/DL — SIGNIFICANT CHANGE UP (ref 32–37)
MCHC RBC-ENTMCNC: 32 G/DL — SIGNIFICANT CHANGE UP (ref 32–37)
MCV RBC AUTO: 90.6 FL — SIGNIFICANT CHANGE UP (ref 80–94)
MCV RBC AUTO: 90.6 FL — SIGNIFICANT CHANGE UP (ref 80–94)
MONOCYTES # BLD AUTO: 0.46 K/UL — SIGNIFICANT CHANGE UP (ref 0.1–0.6)
MONOCYTES # BLD AUTO: 0.46 K/UL — SIGNIFICANT CHANGE UP (ref 0.1–0.6)
MONOCYTES NFR BLD AUTO: 6 % — SIGNIFICANT CHANGE UP (ref 1.7–9.3)
MONOCYTES NFR BLD AUTO: 6 % — SIGNIFICANT CHANGE UP (ref 1.7–9.3)
NEUTROPHILS # BLD AUTO: 5.4 K/UL — SIGNIFICANT CHANGE UP (ref 1.4–6.5)
NEUTROPHILS # BLD AUTO: 5.4 K/UL — SIGNIFICANT CHANGE UP (ref 1.4–6.5)
NEUTROPHILS NFR BLD AUTO: 70.8 % — SIGNIFICANT CHANGE UP (ref 42.2–75.2)
NEUTROPHILS NFR BLD AUTO: 70.8 % — SIGNIFICANT CHANGE UP (ref 42.2–75.2)
NON HDL CHOLESTEROL: 127 MG/DL — SIGNIFICANT CHANGE UP
NON HDL CHOLESTEROL: 127 MG/DL — SIGNIFICANT CHANGE UP
NRBC # BLD: 0 /100 WBCS — SIGNIFICANT CHANGE UP (ref 0–0)
NRBC # BLD: 0 /100 WBCS — SIGNIFICANT CHANGE UP (ref 0–0)
PLATELET # BLD AUTO: 166 K/UL — SIGNIFICANT CHANGE UP (ref 130–400)
PLATELET # BLD AUTO: 166 K/UL — SIGNIFICANT CHANGE UP (ref 130–400)
PMV BLD: 11.5 FL — HIGH (ref 7.4–10.4)
PMV BLD: 11.5 FL — HIGH (ref 7.4–10.4)
POTASSIUM SERPL-MCNC: 4.5 MMOL/L — SIGNIFICANT CHANGE UP (ref 3.5–5)
POTASSIUM SERPL-MCNC: 4.5 MMOL/L — SIGNIFICANT CHANGE UP (ref 3.5–5)
POTASSIUM SERPL-SCNC: 4.5 MMOL/L — SIGNIFICANT CHANGE UP (ref 3.5–5)
POTASSIUM SERPL-SCNC: 4.5 MMOL/L — SIGNIFICANT CHANGE UP (ref 3.5–5)
PROCALCITONIN SERPL-MCNC: 0.07 NG/ML — SIGNIFICANT CHANGE UP (ref 0.02–0.1)
PROCALCITONIN SERPL-MCNC: 0.07 NG/ML — SIGNIFICANT CHANGE UP (ref 0.02–0.1)
PROT SERPL-MCNC: 6.4 G/DL — SIGNIFICANT CHANGE UP (ref 6–8)
PROT SERPL-MCNC: 6.4 G/DL — SIGNIFICANT CHANGE UP (ref 6–8)
PROTHROM AB SERPL-ACNC: 14.4 SEC — HIGH (ref 9.95–12.87)
PROTHROM AB SERPL-ACNC: 14.4 SEC — HIGH (ref 9.95–12.87)
RAPID RVP RESULT: SIGNIFICANT CHANGE UP
RAPID RVP RESULT: SIGNIFICANT CHANGE UP
RBC # BLD: 4.27 M/UL — LOW (ref 4.7–6.1)
RBC # BLD: 4.27 M/UL — LOW (ref 4.7–6.1)
RBC # FLD: 14.5 % — SIGNIFICANT CHANGE UP (ref 11.5–14.5)
RBC # FLD: 14.5 % — SIGNIFICANT CHANGE UP (ref 11.5–14.5)
SARS-COV-2 RNA SPEC QL NAA+PROBE: SIGNIFICANT CHANGE UP
SARS-COV-2 RNA SPEC QL NAA+PROBE: SIGNIFICANT CHANGE UP
SODIUM SERPL-SCNC: 140 MMOL/L — SIGNIFICANT CHANGE UP (ref 135–146)
SODIUM SERPL-SCNC: 140 MMOL/L — SIGNIFICANT CHANGE UP (ref 135–146)
T3 SERPL-MCNC: 85 NG/DL — SIGNIFICANT CHANGE UP (ref 80–200)
T3 SERPL-MCNC: 85 NG/DL — SIGNIFICANT CHANGE UP (ref 80–200)
T4 AB SER-ACNC: 6.6 UG/DL — SIGNIFICANT CHANGE UP (ref 4.6–12)
T4 AB SER-ACNC: 6.6 UG/DL — SIGNIFICANT CHANGE UP (ref 4.6–12)
TRIGL SERPL-MCNC: 124 MG/DL — SIGNIFICANT CHANGE UP
TRIGL SERPL-MCNC: 124 MG/DL — SIGNIFICANT CHANGE UP
TSH SERPL-MCNC: 9.59 UIU/ML — HIGH (ref 0.27–4.2)
TSH SERPL-MCNC: 9.59 UIU/ML — HIGH (ref 0.27–4.2)
WBC # BLD: 7.63 K/UL — SIGNIFICANT CHANGE UP (ref 4.8–10.8)
WBC # BLD: 7.63 K/UL — SIGNIFICANT CHANGE UP (ref 4.8–10.8)
WBC # FLD AUTO: 7.63 K/UL — SIGNIFICANT CHANGE UP (ref 4.8–10.8)
WBC # FLD AUTO: 7.63 K/UL — SIGNIFICANT CHANGE UP (ref 4.8–10.8)

## 2023-11-27 PROCEDURE — 93308 TTE F-UP OR LMTD: CPT | Mod: 26

## 2023-11-27 PROCEDURE — 93010 ELECTROCARDIOGRAM REPORT: CPT | Mod: 59

## 2023-11-27 PROCEDURE — 92960 CARDIOVERSION ELECTRIC EXT: CPT | Mod: XU

## 2023-11-27 PROCEDURE — 93321 DOPPLER ECHO F-UP/LMTD STD: CPT | Mod: 26,59

## 2023-11-27 PROCEDURE — 93312 ECHO TRANSESOPHAGEAL: CPT | Mod: 26,XU

## 2023-11-27 PROCEDURE — 99223 1ST HOSP IP/OBS HIGH 75: CPT

## 2023-11-27 PROCEDURE — 93306 TTE W/DOPPLER COMPLETE: CPT | Mod: 26

## 2023-11-27 PROCEDURE — 93325 DOPPLER ECHO COLOR FLOW MAPG: CPT | Mod: 26,59

## 2023-11-27 PROCEDURE — 99239 HOSP IP/OBS DSCHRG MGMT >30: CPT

## 2023-11-27 RX ORDER — TRAZODONE HCL 50 MG
0 TABLET ORAL
Qty: 0 | Refills: 0 | DISCHARGE

## 2023-11-27 RX ORDER — METOPROLOL TARTRATE 50 MG
1 TABLET ORAL
Qty: 90 | Refills: 0
Start: 2023-11-27 | End: 2023-12-26

## 2023-11-27 RX ORDER — APIXABAN 2.5 MG/1
1 TABLET, FILM COATED ORAL
Qty: 60 | Refills: 0
Start: 2023-11-27 | End: 2023-12-26

## 2023-11-27 RX ADMIN — Medication 10 MG/HR: at 00:07

## 2023-11-27 RX ADMIN — APIXABAN 5 MILLIGRAM(S): 2.5 TABLET, FILM COATED ORAL at 06:12

## 2023-11-27 RX ADMIN — Medication 50 MILLIGRAM(S): at 16:49

## 2023-11-27 RX ADMIN — Medication 450 MICROGRAM(S): at 06:12

## 2023-11-27 RX ADMIN — Medication 100 MILLIGRAM(S): at 06:13

## 2023-11-27 RX ADMIN — Medication 50 MILLIGRAM(S): at 06:12

## 2023-11-27 RX ADMIN — ARIPIPRAZOLE 30 MILLIGRAM(S): 15 TABLET ORAL at 16:49

## 2023-11-27 RX ADMIN — PANTOPRAZOLE SODIUM 40 MILLIGRAM(S): 20 TABLET, DELAYED RELEASE ORAL at 06:12
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
## 2023-11-27 NOTE — DISCHARGE NOTE PROVIDER - HOSPITAL COURSE
49y/o morbidly obese M w/ PMHx DM, HTN, DLD, hypothyroidism s/p thyroidectomy, anxiety, depression and s/p gastric sleeve surgery presenting for evaluation of shortness of breath, cough and fatigue over the last 2 weeks. Patient does not regularly follow-up with cardiologist, he last saw a cardiologist (Dr. Lora) 1.5 years ago for preop testing and everything was normal at that time. Patient denies any headaches, fevers, chills, chest pain, palpitations, n/v/d. abdominal pain, dysuria or recent travel. 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    Vitals: Temp 98.5F, /104, , RR 20, SpO2 96%    Labs: Hgb 13.7 (no previous baseline), Mg 1.7, BNP 1497, Trop -ve x1    EKG shows AFib w/ RVR    Imaging: CXR shows bibasilar opacities    In the ED:  - s/p 2L LR bolus  - s/p Cardizem 20mg IV x1  - s/p Mag 2g IV x1  - s/p Diazepam 10mg PO x1  - s/p Lovenox 190mg SubQ x1    Admitted to medicine for newly diagnosed a-fib.    - New onset paroxysmal Afib most likely triggered by infectious process vs hyperthyroidism   - TEJAS  - morbid obesity     # Recs:  - s/p successful EBENEZER / DCCV 11/27, converted back to NSR   - Keep on telemetry   - cw metoprolol 50 mg every 8 hrs and eliquis 5 mg po BID (CHADS vasc score 1)   - Dc cardizem drip   - F/u TTE   - check TSH (takes levothyroxine 450 mcg every other day)   - keep Mg > 2 and K > 4  - f/u with Pulm as OP for TEJAS and CPAP adjustment in case needed   47y/o morbidly obese M w/ PMHx DM, HTN, DLD, hypothyroidism s/p thyroidectomy, anxiety, depression and s/p gastric sleeve surgery presenting for evaluation of shortness of breath, cough and fatigue over the last 2 weeks. In the ED HR was 150, EKG showed new atrial Fibrillation with Rapid Ventricular Response, he was given Cardizem IV and admitted to telemetry, started on Cardizem drip, HR was controlled, patient was seen by cardiology and EP, recommended EBENEZER and cardioversion, patient went for EBENEZER showed Left atrial appendage clot and subsequently undewent electric cardioversion with 200mg J, converted back to sinus rhythm, now he is stable.     Labs: Hgb 13.7 (no previous baseline), Mg 1.7, BNP 1497, Trop -ve x1              - New onset paroxysmal Afib most likely triggered by infectious process vs hyperthyroidism   - TEJAS  - morbid obesity     - s/p successful EBENEZER / DCCV 11/27, converted back to NSR   Echo showed normal LVEF 55%, mild TR  - cw metoprolol 50 mg every 8 hrs and eliquis 5 mg po BID (CHADS vasc score 1)   - f/u with Pulm as OP for TEJAS and CPAP adjustment in case needed

## 2023-11-27 NOTE — DISCHARGE NOTE PROVIDER - NSDCMRMEDTOKEN_GEN_ALL_CORE_FT
Abilify 20 mg oral tablet: 1.5 tab(s) orally once a day  Eliquis Starter Pack for Treatment of DVT and PE 5 mg oral tablet: 1 tab(s) orally 2 times a day  levothyroxine 300 mcg (0.3 mg) oral tablet: 1.5 tab(s) orally once a day  metoprolol tartrate 50 mg oral tablet: 1 tab(s) orally every 8 hours  traZODone 100 mg oral tablet: 1 tab(s) orally once a day (at bedtime)  traZODone 150 mg oral tablet: orally  Zoloft: 200 milligram(s) orally once a day   Abilify 20 mg oral tablet: 1.5 tab(s) orally once a day  Eliquis Starter Pack for Treatment of DVT and PE 5 mg oral tablet: 1 tab(s) orally 2 times a day  levothyroxine 300 mcg (0.3 mg) oral tablet: 1.5 tab(s) orally once a day  metoprolol tartrate 50 mg oral tablet: 1 tab(s) orally every 8 hours  traZODone 100 mg oral tablet: 1 tab(s) orally once a day (at bedtime)  traZODone 150 mg oral tablet: orally once a day (at bedtime)  Zoloft: 200 milligram(s) orally once a day

## 2023-11-27 NOTE — DISCHARGE NOTE NURSING/CASE MANAGEMENT/SOCIAL WORK - PATIENT PORTAL LINK FT
You can access the FollowMyHealth Patient Portal offered by [facility] by registering at the following website: [email or web address]. By joining [facility]’s FollowMyHealth portal, you will also be able to view your health information using other applications (apps) compatible with our system.

## 2023-11-27 NOTE — DISCHARGE NOTE PROVIDER - CARE PROVIDER_API CALL
Javan Bryant  Internal Medicine  [address] [place] [address]  Phone: [phone]  Fax: [phone]  Follow Up Time: 1 week    Aravind Casillas  Cardiovascular Disease  [address] [place] [id number]  Phone: [phone]  Fax: [phone]  Follow Up Time: 2 weeks

## 2023-11-27 NOTE — CONSULT NOTE ADULT - ATTENDING COMMENTS
Persistent AF  Morbid Obesity    Eliquis 5mg q12h  EBENEZER/DCCV today  Social service c/s  Metoprolol  DC cardizem  Ok to DC from EP standpoint of view once DCCV is done  OP f-up

## 2023-11-27 NOTE — CONSULT NOTE ADULT - ASSESSMENT
49y/o morbidly obese M w/ PMHx pre DM, HTN, DLD, Papillary thyroid carcinoma s/p thyroidectomy, anxiety, depression and morbid obesity s/p gastric sleeve surgery presenting for evaluation of shortness of breath, cough and fatigue over the last 2 weeks  was found to be in new onset paroxysmal Afib with RVR on arrival to the ER, started on cardizem drip and AC  metoprolol 50 mg every 8 hrs started by the medical team. patient denies any chest pain, no palpitations, no sob.   is being scheduled for EBENEZER / DCCV today. CXR with cardiomegaly and bilateral lung infiltrates      # Impression:  - New onset paroxysmal Afib most likely triggered by infectious process vs hyperthyroidism   - TEJAS  - morbid obesity     # Recs:  - Keep on telemetry   - cw metoprolol 50 mg every 8 hrs and eliquis 5 mg po BID (CHADS vasc score 1)   - Dc cardizem drip   - F/u TTE   - check TSH (takes levothyroxine 450 mcg every other day)   - keep Mg > 2 and K > 4  - f/u with Pulm as OP for TEJAS and CPAP adjustment in case needed   47y/o morbidly obese M w/ PMHx pre DM, HTN, DLD, Papillary thyroid carcinoma s/p thyroidectomy, anxiety, depression and morbid obesity s/p gastric sleeve surgery presenting for evaluation of shortness of breath, cough and fatigue over the last 2 weeks  was found to be in new onset paroxysmal Afib with RVR on arrival to the ER, started on cardizem drip and AC  metoprolol 50 mg every 8 hrs started by the medical team. patient denies any chest pain, no palpitations, no sob.   is being scheduled for EBENEZER / DCCV today. CXR with cardiomegaly and bilateral lung infiltrates      # Impression:  - New onset paroxysmal Afib most likely triggered by infectious process vs hyperthyroidism   - TEJAS  - morbid obesity     # Recs:  - s/p successful EBENEZER / DCCV today, converted back to NSR   - Keep on telemetry   - cw metoprolol 50 mg every 8 hrs and eliquis 5 mg po BID (CHADS vasc score 1)   - Dc cardizem drip   - F/u TTE   - check TSH (takes levothyroxine 450 mcg every other day)   - keep Mg > 2 and K > 4  - f/u with Pulm as OP for TEJAS and CPAP adjustment in case needed

## 2023-11-27 NOTE — DISCHARGE NOTE PROVIDER - NSDCCPCAREPLAN_GEN_ALL_CORE_FT
PRINCIPAL DISCHARGE DIAGNOSIS  Diagnosis: Atrial fibrillation with RVR  Assessment and Plan of Treatment: Atrial fibrillation is a type of irregular heartbeat (arrhythmia) where the heart quivers continuously in a chaotic pattern that makes the heart unable to pump blood normally. This can increase the risk for stroke, heart failure, and other heart-related conditions. Atrial fibrillation can be caused by a variety of conditions and may be temporary, intermittent, or permanent. Symptoms include feeling that your heart is beating rapidly or irregularly, chest discomfort, shortness of breath, or dizziness/lightheadedness that may be worse with exertion. Treatment is varied but may involve medication or electrical shock (cardioversion).  SEEK IMMEDIATE MEDICAL CARE IF YOU HAVE ANY OF THE FOLLOWING SYMPTOMS: chest pain, shortness of breath, abdominal pain, sweating, vomiting, blood in vomit/bowel movements/urine, dizziness/lightheadedness, weakness or numbness to face/arm/leg, trouble speaking or understanding, facial droop.  Please take your eliquis and metoprolol as prescribed and follow up with a cardiologist.

## 2023-11-27 NOTE — DISCHARGE NOTE PROVIDER - PROVIDER TOKENS
PROVIDER:[TOKEN:[[id number]],FOLLOWUP:[1 week]],PROVIDER:[TOKEN:[[id number]],FOLLOWUP:[2 weeks]]

## 2023-11-27 NOTE — DISCHARGE NOTE PROVIDER - ATTENDING DISCHARGE PHYSICAL EXAMINATION:
PHYSICAL EXAM:  GENERAL: NAD, well-developed. Morbidly obese.   HEAD:  Atraumatic, Normocephalic.  EYES: EOMI, PERRLA, conjunctiva and sclera clear.  NECK: Supple, No JVD.  CHEST/LUNG: Clear to auscultation bilaterally; No wheeze.  HEART: Regular rate and rhythm; S1 S2.   ABDOMEN: Soft, Nontender, Nondistended; Bowel sounds present.  EXTREMITIES:  2+ Peripheral Pulses, No clubbing, cyanosis, or edema.  PSYCH: AAOx3.  NEUROLOGY: non-focal.  SKIN: No rashes or lesions.

## 2023-11-27 NOTE — CHART NOTE - NSCHARTNOTEFT_GEN_A_CORE
TRANSESOPHAGEAL ECHOCARDIOGRAM PROCEDURE NOTE    PRE-OP DIAGNOSIS:  AF    POST-OP DIAGNOSIS:  No SHAY thrombus.  Successful DCCV to SR.    PROCEDURE:  Transesophageal echocardiogram and DC cardioversion    Primary Physician:  Kathleen  Assistant:  ALEX Burton    ANESTHESIA TYPE  [ ]  General Anesthesia  [X] Conscious Sedation  [ ]  Local/Regional    CONDITION  [ ] Critical  [ ] Serious  [ ] Fair  [X] Good    SPECIMENS REMOVED (IF APPLICABLE):  N/A    IMPLANTS (IF APPLICABLE):  N/A    ESTIMATED BLOOD LOSS:  None    COMPLICATIONS:  None      Risks and benefits of procedures were explained and informed consent was obtained.   Refer to Anesthesia documentation for sedation details.  The EBENEZER probe was passed into the esophagus and removed without difficulty.  No evidence of bleeding.  The patient tolerated the procedure well without any immediate complications.    Preliminary Findings:  Limited study  Patient intubated for airway protection  No SHAY thrombus    Successful cardioversion to NSR with 200 J x 2.    Findings and plan of care discussed with patient/.

## 2023-12-02 DIAGNOSIS — E66.01 MORBID (SEVERE) OBESITY DUE TO EXCESS CALORIES: ICD-10-CM

## 2023-12-02 DIAGNOSIS — G47.33 OBSTRUCTIVE SLEEP APNEA (ADULT) (PEDIATRIC): ICD-10-CM

## 2023-12-02 DIAGNOSIS — E89.0 POSTPROCEDURAL HYPOTHYROIDISM: ICD-10-CM

## 2023-12-02 DIAGNOSIS — I48.0 PAROXYSMAL ATRIAL FIBRILLATION: ICD-10-CM

## 2023-12-02 DIAGNOSIS — F41.9 ANXIETY DISORDER, UNSPECIFIED: ICD-10-CM

## 2023-12-02 DIAGNOSIS — E83.42 HYPOMAGNESEMIA: ICD-10-CM

## 2023-12-02 DIAGNOSIS — Z98.84 BARIATRIC SURGERY STATUS: ICD-10-CM

## 2023-12-02 DIAGNOSIS — Z79.84 LONG TERM (CURRENT) USE OF ORAL HYPOGLYCEMIC DRUGS: ICD-10-CM

## 2023-12-02 DIAGNOSIS — F32.9 MAJOR DEPRESSIVE DISORDER, SINGLE EPISODE, UNSPECIFIED: ICD-10-CM

## 2023-12-02 DIAGNOSIS — Z99.89 DEPENDENCE ON OTHER ENABLING MACHINES AND DEVICES: ICD-10-CM

## 2023-12-02 DIAGNOSIS — Z79.85 LONG-TERM (CURRENT) USE OF INJECTABLE NON-INSULIN ANTIDIABETIC DRUGS: ICD-10-CM

## 2023-12-02 DIAGNOSIS — Z79.890 HORMONE REPLACEMENT THERAPY: ICD-10-CM

## 2023-12-02 DIAGNOSIS — G47.00 INSOMNIA, UNSPECIFIED: ICD-10-CM

## 2023-12-02 DIAGNOSIS — E11.9 TYPE 2 DIABETES MELLITUS WITHOUT COMPLICATIONS: ICD-10-CM

## 2023-12-27 ENCOUNTER — APPOINTMENT (OUTPATIENT)
Dept: ELECTROPHYSIOLOGY | Facility: CLINIC | Age: 48
End: 2023-12-27
Payer: MEDICARE

## 2023-12-27 VITALS
HEIGHT: 69 IN | DIASTOLIC BLOOD PRESSURE: 96 MMHG | SYSTOLIC BLOOD PRESSURE: 160 MMHG | BODY MASS INDEX: 46.65 KG/M2 | WEIGHT: 315 LBS | HEART RATE: 105 BPM

## 2023-12-27 PROCEDURE — 93000 ELECTROCARDIOGRAM COMPLETE: CPT

## 2023-12-27 PROCEDURE — 99214 OFFICE O/P EST MOD 30 MIN: CPT

## 2023-12-27 RX ORDER — TIZANIDINE 4 MG/1
4 TABLET ORAL TWICE DAILY
Qty: 60 | Refills: 1 | Status: DISCONTINUED | COMMUNITY
Start: 2022-06-27 | End: 2023-12-27

## 2023-12-27 RX ORDER — DILTIAZEM HYDROCHLORIDE 180 MG/1
180 CAPSULE, COATED, EXTENDED RELEASE ORAL DAILY
Refills: 0 | Status: ACTIVE | COMMUNITY

## 2023-12-27 RX ORDER — CALCITRIOL 0.5 UG/1
0.5 CAPSULE, LIQUID FILLED ORAL
Qty: 30 | Refills: 0 | Status: DISCONTINUED | COMMUNITY
Start: 2019-12-02 | End: 2023-12-27

## 2023-12-27 RX ORDER — ARIPIPRAZOLE 20 MG/1
20 TABLET ORAL
Qty: 15 | Refills: 0 | Status: DISCONTINUED | COMMUNITY
Start: 2019-03-05 | End: 2023-12-27

## 2023-12-27 RX ORDER — ALBUTEROL SULFATE 90 UG/1
108 (90 BASE) INHALANT RESPIRATORY (INHALATION)
Qty: 1 | Refills: 5 | Status: DISCONTINUED | COMMUNITY
Start: 2019-07-23 | End: 2023-12-27

## 2023-12-27 RX ORDER — LEVOTHYROXINE SODIUM 300 UG/1
300 TABLET ORAL DAILY
Refills: 0 | Status: ACTIVE | COMMUNITY
Start: 2019-12-12

## 2023-12-27 NOTE — ADDENDUM
[FreeTextEntry1] : Salena ESPINO assisted in documentation on 12/27/2023 acting as a scribe for Dr. Orlando Lion.

## 2023-12-27 NOTE — HISTORY OF PRESENT ILLNESS
[FreeTextEntry1] : Mr. Verma is a 48-year-old male with PMHx of morbid obesity s/p gastric bypass surgery, HTN, hypothyroidism, TEJAS on CPAP, persistent AF s/p CCTA-DCCV, is here for management of AF.     Was admitted to [facility] for AF. He was feeling shortness of breath.     He works as a .    S/p DCCV. Feels fine.    Denies chest pain, shortness of breath, palpitation, dizziness or LOC except noted above.  EKG (12/27/2023): SR @ 105, , , QTc 461 Echo (11/2023): Normal EF, mild LVH, mild TR, LA not well visualized Cardio: Dr. Lora

## 2023-12-27 NOTE — ASSESSMENT
[FreeTextEntry1] : ## Persistent atrial fibrillation s/p DCCV   ## Morbid obesity     - On Eliquis. Compliant. No bleeding issues. Patient to contact us if there is any bleeding issues, interruption or any issues with OAC. Patient to go to ER/call 911 if any major bleeding. - CHADSVASC 1+ (HTN). However, he has morbid obesity and sleep apnea. He also had persistent AF. He remains at a higher risk of thromboembolic events. Will continue OAC for now. Will reassess at a later time.     - On Cardizem.    - Had tiredness with Metoprolol. Off Metoprolol.   - We discussed different management options including AAD versus ablation. At this moment, he is not a candidate for ablation due to his weight. He does not want to try AAD considering it was the first episode. Will continue to monitor for now.  - Discussed importance of risk factor management. - Sleep study/Management of TEJAS - Diet/exercise/weight loss - Management of GERD if present - RTC in 6 months.

## 2024-05-01 NOTE — DISCHARGE NOTE PROVIDER - ATTENDING ATTESTATION STATEMENT
[facility]  [facility]  [address] [place] [id number]  [phone]    Note to the patient: The 21st Century Cures Act makes medical notes like these available to patients in the interest of transparency. Please be aware that this is a medical document, written in medical language, and intended for doctor-to-doctor communication. Medical documents are intended to carry relevant information and the clinical opinion of the author in a brief format. For this reason, medical notes may seem blunt or direct, and may contain abbreviations or verbiage that are unfamiliar.    Referring Physician: Mars Rosario MD    Chief Complaint: Diarrhea   [New Consult]  [Language/: English]    HPI:  Mr. Donavon Zapien is a pleasant 71 year old male with PMH of small bowel perforation s/p ex-lap with small bowel resection (25cm of ileum) 2021 who presents to clinic for consultation regarding chronic diarrhea since 8/2023, associated with excess gas/flatulence.    Saw Dr. Rosario earlier this year. Reports since last August he's been having frequent diarrhea/watery stool. Also reports he's had a lot of gas, foul-smelling flatulence, that he didn't used to. Stool tends to be a bit more normal in the morning. Denies abdominal pain or discomfort. His son has ulcerative colitis. No weight loss. No nocturnal stools.    Has tried fiber supplement -- psyllium husk daily, didn't seem to do much.    Travels a lot. Going to [facility] in 9 days.    Bowel habits:  - Frequency: On average, 2-3 per day  - Consistency: First one is more formed but latter are unformed  - Straining: Denies  - Blood: Denies    Last Colonoscopy: 4/2021 - normal      ROS: 10 systems were reviewed and were negative except as per HPI.    Past Medical/Surgical History:   No past medical history on file.    No past surgical history on file.  As per HPI    Social History:     Works as an entrepreneuer, does  tech work    Family History: No family history on file.  Family history of colon cancer: Denies  Family history of gastric cancer: Denies    Allergies: ALLERGIES:  No Known Allergies    Home Meds:   Current Outpatient Medications   Medication Sig Dispense Refill   • simvastatin (ZOCOR) 20 MG tablet Take 20 mg by mouth daily.     • atenolol (TENORMIN) 50 MG tablet      • metroNIDAZOLE (FLAGYL) 500 MG tablet TAKE 1 TABLET BY MOUTH THREE TIMES A DAY FOR 10 DAYS     • ciprofloxacin (Cipro) 500 MG tablet Take 1 tablet by mouth in the morning and 1 tablet in the evening. 20 tablet 0     No current facility-administered medications for this visit.       Objective   VITALS:  Vitals:    05/01/24 0818   BP: 122/73   Pulse: (!) 118   Resp: 16   Temp: 98.1 °F (36.7 °C)       Exam:  General: Non-toxic appearing, in no acute distress  HEENT: No scleral icterus, moist mucous membranes  Lungs: Comfortably breathing on room air  Abdomen: Soft, non-tender, non-distended, no rebound or guarding  Extremities: Warm and well-perfused. No edema  Skin: Warm, dry, no visible rashes  Neuro: Alert and oriented. No focal deficits. CN 2-12 grossly intact  Psych: Appropriate affect, recall intact    Labs:  No results found for: \"WBC\", \"HGB\", \"HCT\", \"PLT\"  No results found for: \"NA\", \"K\", \"CO2\", \"BUN\", \"CREATININE\", \"GLUCOSE\", \"CALCIUM\"  No results found for: \"PROT\", \"ALBC\", \"ALT\", \"AST\"    Labs were reviewed.    IMAGING/PROCEDURES:    EGD:      Colonoscopy:      Other:      Imaging and procedures were reviewed.       Assessment & Plan    Mr. Donavon Zapien is a pleasant 71 year old male with PMH of small bowel perforation s/p ex-lap with small bowel resection (25cm of ileum) 2021 who presents to clinic for consultation regarding chronic diarrhea since 8/2023, associated with excess gas/flatulence.    No improvement with psyllium husk daily. No alarm symptoms. Normal colonoscopy in 2021, so reviewed with him that at this point unless something  changes or he has signs of iron deficiency it wouldn't normally be high yield to repeat it.    PLAN:  - Labs: fecal calprotectin, giardia (through GI parasite PCR stool panel), celiac screen, TSH, CRP. CBC/CMP reviewed from recent tests at OSH and were fairly normal  - Also checking iron studies given his Hgb was 13.0 (LLN was 13.2 at the lab that he had the test)  - Counseled on empiric diet changes aimed at reducing intestinal gas. Information provided. If no improvement can consider formal low FODMAP diet  - If labs return normal, would trial empiric bile acid sequestrant for possible bile acid malabsorption  - In future would consider empiric treatment for SIBO  - Due for colon cancer screening in 2031 (10 years from 2021)    It was a pleasure seeing Mr. Zapien in clinic today. I recommend they return to clinic in 4 months.    The plan of care was discussed with the patient who expressed understanding and agreement. The patient was provided the opportunity to ask questions, and all questions were answered to the best of my ability.    I spent a total of 35 minutes on the day of the visit.   This time includes a combination of pre-charting, chart review, documenting, entering orders, coordination of care, and direct patient care/counseling.    Everett Elizondo MD  Gastroenterology/Hepatology   I have personally seen and examined the patient. I have collaborated with and supervised the

## 2024-06-06 ENCOUNTER — OUTPATIENT (OUTPATIENT)
Dept: OUTPATIENT SERVICES | Facility: HOSPITAL | Age: 49
LOS: 1 days | End: 2024-06-06
Payer: MEDICARE

## 2024-06-06 DIAGNOSIS — Z00.8 ENCOUNTER FOR OTHER GENERAL EXAMINATION: ICD-10-CM

## 2024-06-06 DIAGNOSIS — E89.0 POSTPROCEDURAL HYPOTHYROIDISM: ICD-10-CM

## 2024-06-06 PROCEDURE — 76536 US EXAM OF HEAD AND NECK: CPT | Mod: 26

## 2024-06-06 PROCEDURE — 76536 US EXAM OF HEAD AND NECK: CPT

## 2024-06-07 DIAGNOSIS — E89.0 POSTPROCEDURAL HYPOTHYROIDISM: ICD-10-CM

## 2024-07-12 ENCOUNTER — NON-APPOINTMENT (OUTPATIENT)
Age: 49
End: 2024-07-12

## 2024-07-17 ENCOUNTER — APPOINTMENT (OUTPATIENT)
Dept: ELECTROPHYSIOLOGY | Facility: CLINIC | Age: 49
End: 2024-07-17

## 2025-01-03 NOTE — ED PROVIDER NOTE - EKG ADDITIONAL INFORMATION FREE TEXT
Patient has paroxysmal (<7 days) atrial fibrillation. Patient is currently in sinus rhythm. [id number] Score: 3. The patients heart rate in the last 24 hours is as follows:  Pulse  Min: 69  Max: 81     Antiarrhythmics       Anticoagulants  apixaban tablet 5 mg, 2 times daily, Oral  heparin (porcine) injection 1,000 Units, As needed (PRN), Intra-Catheter    Plan  - Replete lytes with a goal of K>4, Mg >2  - Patient is anticoagulated, see medications listed above.  - Patient's afib is currently controlled  - Previously had afib, and on Eliquis once a day because of bruising?  As she is being monitored will put on appropriate BID dosing   SVT at 159 bpm.  Normal axes.  Normal intervals.  No acute STEMI. SVT at 159 bpm.  Normal axes.  Normal intervals.  No acute STEMI.  Repeat EKG at 14:19– A-fib with  bpm, normal axes, normal intervals.  No acute STEMI

## 2025-01-22 ENCOUNTER — APPOINTMENT (OUTPATIENT)
Dept: PULMONOLOGY | Facility: CLINIC | Age: 50
End: 2025-01-22

## 2025-06-06 ENCOUNTER — OUTPATIENT (OUTPATIENT)
Dept: OUTPATIENT SERVICES | Facility: HOSPITAL | Age: 50
LOS: 1 days | End: 2025-06-06
Payer: MEDICARE

## 2025-06-06 DIAGNOSIS — E07.9 DISORDER OF THYROID, UNSPECIFIED: ICD-10-CM

## 2025-06-06 DIAGNOSIS — Z00.8 ENCOUNTER FOR OTHER GENERAL EXAMINATION: ICD-10-CM

## 2025-06-06 PROCEDURE — 76536 US EXAM OF HEAD AND NECK: CPT | Mod: 26

## 2025-06-06 PROCEDURE — 76536 US EXAM OF HEAD AND NECK: CPT

## 2025-06-07 DIAGNOSIS — E07.9 DISORDER OF THYROID, UNSPECIFIED: ICD-10-CM

## 2025-06-30 ENCOUNTER — APPOINTMENT (OUTPATIENT)
Dept: INTERVENTIONAL RADIOLOGY/VASCULAR | Facility: CLINIC | Age: 50
End: 2025-06-30

## 2025-06-30 ENCOUNTER — OUTPATIENT (OUTPATIENT)
Dept: OUTPATIENT SERVICES | Facility: HOSPITAL | Age: 50
LOS: 1 days | End: 2025-06-30
Payer: MEDICARE

## 2025-06-30 DIAGNOSIS — Z00.8 ENCOUNTER FOR OTHER GENERAL EXAMINATION: ICD-10-CM

## 2025-06-30 DIAGNOSIS — R22.1 LOCALIZED SWELLING, MASS AND LUMP, NECK: ICD-10-CM

## 2025-06-30 PROCEDURE — 70491 CT SOFT TISSUE NECK W/DYE: CPT

## 2025-06-30 PROCEDURE — 70491 CT SOFT TISSUE NECK W/DYE: CPT | Mod: 26

## 2025-07-01 DIAGNOSIS — R22.1 LOCALIZED SWELLING, MASS AND LUMP, NECK: ICD-10-CM

## 2025-08-04 ENCOUNTER — OUTPATIENT (OUTPATIENT)
Dept: OUTPATIENT SERVICES | Facility: HOSPITAL | Age: 50
LOS: 1 days | End: 2025-08-04
Payer: MEDICARE

## 2025-08-04 DIAGNOSIS — I25.10 ATHEROSCLEROTIC HEART DISEASE OF NATIVE CORONARY ARTERY WITHOUT ANGINA PECTORIS: ICD-10-CM

## 2025-08-04 DIAGNOSIS — Z00.8 ENCOUNTER FOR OTHER GENERAL EXAMINATION: ICD-10-CM

## 2025-08-04 PROCEDURE — 75574 CT ANGIO HRT W/3D IMAGE: CPT

## 2025-08-04 PROCEDURE — 75574 CT ANGIO HRT W/3D IMAGE: CPT | Mod: 26

## 2025-08-05 DIAGNOSIS — I25.10 ATHEROSCLEROTIC HEART DISEASE OF NATIVE CORONARY ARTERY WITHOUT ANGINA PECTORIS: ICD-10-CM
